# Patient Record
Sex: FEMALE | Race: WHITE | NOT HISPANIC OR LATINO | Employment: OTHER | URBAN - NONMETROPOLITAN AREA
[De-identification: names, ages, dates, MRNs, and addresses within clinical notes are randomized per-mention and may not be internally consistent; named-entity substitution may affect disease eponyms.]

---

## 2019-01-01 ENCOUNTER — APPOINTMENT (INPATIENT)
Dept: INTERVENTIONAL RADIOLOGY/VASCULAR | Facility: HOSPITAL | Age: 84
DRG: 597 | End: 2019-01-01
Attending: FAMILY MEDICINE
Payer: MEDICARE

## 2019-01-01 ENCOUNTER — TELEPHONE (OUTPATIENT)
Dept: MAMMOGRAPHY | Facility: CLINIC | Age: 84
End: 2019-01-01

## 2019-01-01 ENCOUNTER — HOSPITAL ENCOUNTER (OUTPATIENT)
Facility: HOSPITAL | Age: 84
End: 2019-03-03
Attending: FAMILY MEDICINE | Admitting: FAMILY MEDICINE

## 2019-01-01 ENCOUNTER — APPOINTMENT (EMERGENCY)
Dept: RADIOLOGY | Facility: HOSPITAL | Age: 84
DRG: 597 | End: 2019-01-01
Payer: MEDICARE

## 2019-01-01 ENCOUNTER — APPOINTMENT (INPATIENT)
Dept: NON INVASIVE DIAGNOSTICS | Facility: HOSPITAL | Age: 84
DRG: 597 | End: 2019-01-01
Payer: MEDICARE

## 2019-01-01 ENCOUNTER — HOSPITAL ENCOUNTER (INPATIENT)
Facility: HOSPITAL | Age: 84
LOS: 1 days | DRG: 597 | End: 2019-03-01
Attending: EMERGENCY MEDICINE | Admitting: FAMILY MEDICINE
Payer: MEDICARE

## 2019-01-01 ENCOUNTER — HOSPITAL ENCOUNTER (OUTPATIENT)
Dept: MAMMOGRAPHY | Facility: HOSPITAL | Age: 84
Discharge: HOME | End: 2019-01-31
Payer: MEDICARE

## 2019-01-01 ENCOUNTER — APPOINTMENT (EMERGENCY)
Dept: CT IMAGING | Facility: HOSPITAL | Age: 84
DRG: 597 | End: 2019-01-01
Payer: MEDICARE

## 2019-01-01 ENCOUNTER — TRANSCRIBE ORDERS (OUTPATIENT)
Dept: ADMINISTRATIVE | Facility: HOSPITAL | Age: 84
End: 2019-01-01

## 2019-01-01 ENCOUNTER — HOSPITAL ENCOUNTER (OUTPATIENT)
Dept: ULTRASOUND IMAGING | Facility: HOSPITAL | Age: 84
Discharge: HOME | End: 2019-01-31
Payer: MEDICARE

## 2019-01-01 VITALS
OXYGEN SATURATION: 77 % | TEMPERATURE: 98.7 F | BODY MASS INDEX: 21.38 KG/M2 | DIASTOLIC BLOOD PRESSURE: 46 MMHG | HEIGHT: 60 IN | SYSTOLIC BLOOD PRESSURE: 77 MMHG | WEIGHT: 108.91 LBS | HEART RATE: 118 BPM | RESPIRATION RATE: 16 BRPM

## 2019-01-01 VITALS
HEIGHT: 60 IN | TEMPERATURE: 98.5 F | SYSTOLIC BLOOD PRESSURE: 130 MMHG | BODY MASS INDEX: 21.4 KG/M2 | HEART RATE: 87 BPM | RESPIRATION RATE: 20 BRPM | DIASTOLIC BLOOD PRESSURE: 60 MMHG | OXYGEN SATURATION: 90 % | WEIGHT: 109 LBS

## 2019-01-01 VITALS — HEIGHT: 69 IN | BODY MASS INDEX: 26.66 KG/M2 | WEIGHT: 180 LBS

## 2019-01-01 DIAGNOSIS — R09.02 HYPOXIA: Primary | ICD-10-CM

## 2019-01-01 DIAGNOSIS — E87.0 HYPERNATREMIA: ICD-10-CM

## 2019-01-01 DIAGNOSIS — C79.9 METASTATIC DISEASE (HCC): ICD-10-CM

## 2019-01-01 DIAGNOSIS — J90 PLEURAL EFFUSION ON RIGHT: ICD-10-CM

## 2019-01-01 DIAGNOSIS — R92.8 ABNORMAL FINDINGS ON DIAGNOSTIC IMAGING OF BREAST: Primary | ICD-10-CM

## 2019-01-01 DIAGNOSIS — R41.82 ALTERED MENTAL STATUS: ICD-10-CM

## 2019-01-01 DIAGNOSIS — R93.89 ABNORMAL ULTRASOUND: Primary | ICD-10-CM

## 2019-01-01 DIAGNOSIS — N63.0 LUMP OR MASS IN BREAST: ICD-10-CM

## 2019-01-01 DIAGNOSIS — R06.00 DYSPNEA, UNSPECIFIED TYPE: ICD-10-CM

## 2019-01-01 DIAGNOSIS — N19 RENAL FAILURE: ICD-10-CM

## 2019-01-01 LAB
ALBUMIN SERPL BCP-MCNC: 1.9 G/DL (ref 3.5–5)
ALBUMIN SERPL BCP-MCNC: 2.3 G/DL (ref 3.5–5)
ALP SERPL-CCNC: 115 U/L (ref 46–116)
ALP SERPL-CCNC: 94 U/L (ref 46–116)
ALT SERPL W P-5'-P-CCNC: 19 U/L (ref 12–78)
ALT SERPL W P-5'-P-CCNC: 22 U/L (ref 12–78)
ANION GAP SERPL CALCULATED.3IONS-SCNC: 12 MMOL/L (ref 4–13)
ANION GAP SERPL CALCULATED.3IONS-SCNC: 14 MMOL/L (ref 4–13)
APTT PPP: 25 SECONDS (ref 26–38)
AST SERPL W P-5'-P-CCNC: 25 U/L (ref 5–45)
AST SERPL W P-5'-P-CCNC: 28 U/L (ref 5–45)
ATRIAL RATE: 123 BPM
BACTERIA BLD CULT: ABNORMAL
BASOPHILS # BLD AUTO: 0.01 THOUSANDS/ΜL (ref 0–0.1)
BASOPHILS # BLD AUTO: 0.01 THOUSANDS/ΜL (ref 0–0.1)
BASOPHILS NFR BLD AUTO: 0 % (ref 0–1)
BASOPHILS NFR BLD AUTO: 0 % (ref 0–1)
BILIRUB SERPL-MCNC: 0.3 MG/DL (ref 0.2–1)
BILIRUB SERPL-MCNC: 0.4 MG/DL (ref 0.2–1)
BILIRUB UR QL STRIP: ABNORMAL
BUN SERPL-MCNC: 76 MG/DL (ref 5–25)
BUN SERPL-MCNC: 78 MG/DL (ref 5–25)
CALCIUM SERPL-MCNC: 8.6 MG/DL (ref 8.3–10.1)
CALCIUM SERPL-MCNC: 9.5 MG/DL (ref 8.3–10.1)
CHLORIDE SERPL-SCNC: 112 MMOL/L (ref 100–108)
CHLORIDE SERPL-SCNC: 119 MMOL/L (ref 100–108)
CHOLEST SERPL-MCNC: 87 MG/DL (ref 50–200)
CLARITY UR: CLEAR
CO2 SERPL-SCNC: 24 MMOL/L (ref 21–32)
CO2 SERPL-SCNC: 26 MMOL/L (ref 21–32)
COLOR UR: YELLOW
CREAT SERPL-MCNC: 1.83 MG/DL (ref 0.6–1.3)
CREAT SERPL-MCNC: 2.22 MG/DL (ref 0.6–1.3)
CREAT UR-MCNC: 145 MG/DL
EOSINOPHIL # BLD AUTO: 0 THOUSAND/ΜL (ref 0–0.61)
EOSINOPHIL # BLD AUTO: 0 THOUSAND/ΜL (ref 0–0.61)
EOSINOPHIL NFR BLD AUTO: 0 % (ref 0–6)
EOSINOPHIL NFR BLD AUTO: 0 % (ref 0–6)
ERYTHROCYTE [DISTWIDTH] IN BLOOD BY AUTOMATED COUNT: 13.2 % (ref 11.6–15.1)
ERYTHROCYTE [DISTWIDTH] IN BLOOD BY AUTOMATED COUNT: 13.4 % (ref 11.6–15.1)
EST. AVERAGE GLUCOSE BLD GHB EST-MCNC: 166 MG/DL
FLUAV AG SPEC QL IA: NEGATIVE
FLUAV AG SPEC QL: NOT DETECTED
FLUBV AG SPEC QL IA: NEGATIVE
FLUBV AG SPEC QL: NOT DETECTED
GFR SERPL CREATININE-BSD FRML MDRD: 19 ML/MIN/1.73SQ M
GFR SERPL CREATININE-BSD FRML MDRD: 24 ML/MIN/1.73SQ M
GLUCOSE SERPL-MCNC: 127 MG/DL (ref 65–140)
GLUCOSE SERPL-MCNC: 134 MG/DL (ref 65–140)
GLUCOSE SERPL-MCNC: 150 MG/DL (ref 65–140)
GLUCOSE SERPL-MCNC: 209 MG/DL (ref 65–140)
GLUCOSE SERPL-MCNC: 212 MG/DL (ref 65–140)
GLUCOSE UR STRIP-MCNC: NEGATIVE MG/DL
GRAM STN SPEC: ABNORMAL
HBA1C MFR BLD: 7.4 % (ref 4.2–6.3)
HCT VFR BLD AUTO: 41.7 % (ref 34.8–46.1)
HCT VFR BLD AUTO: 48 % (ref 34.8–46.1)
HDLC SERPL-MCNC: 36 MG/DL (ref 40–60)
HGB BLD-MCNC: 12.8 G/DL (ref 11.5–15.4)
HGB BLD-MCNC: 15 G/DL (ref 11.5–15.4)
HGB UR QL STRIP.AUTO: NEGATIVE
IMM GRANULOCYTES # BLD AUTO: 0.04 THOUSAND/UL (ref 0–0.2)
IMM GRANULOCYTES # BLD AUTO: 0.08 THOUSAND/UL (ref 0–0.2)
IMM GRANULOCYTES NFR BLD AUTO: 0 % (ref 0–2)
IMM GRANULOCYTES NFR BLD AUTO: 1 % (ref 0–2)
INR PPP: 1.21 (ref 0.86–1.17)
KETONES UR STRIP-MCNC: NEGATIVE MG/DL
LACTATE SERPL-SCNC: 1.9 MMOL/L (ref 0.5–2)
LACTATE SERPL-SCNC: 2.7 MMOL/L (ref 0.5–2)
LDLC SERPL CALC-MCNC: 34 MG/DL (ref 0–100)
LEUKOCYTE ESTERASE UR QL STRIP: NEGATIVE
LYMPHOCYTES # BLD AUTO: 1.74 THOUSANDS/ΜL (ref 0.6–4.47)
LYMPHOCYTES # BLD AUTO: 3.03 THOUSANDS/ΜL (ref 0.6–4.47)
LYMPHOCYTES NFR BLD AUTO: 18 % (ref 14–44)
LYMPHOCYTES NFR BLD AUTO: 26 % (ref 14–44)
MAGNESIUM SERPL-MCNC: 2.4 MG/DL (ref 1.6–2.6)
MCH RBC QN AUTO: 28.8 PG (ref 26.8–34.3)
MCH RBC QN AUTO: 28.9 PG (ref 26.8–34.3)
MCHC RBC AUTO-ENTMCNC: 30.7 G/DL (ref 31.4–37.4)
MCHC RBC AUTO-ENTMCNC: 31.3 G/DL (ref 31.4–37.4)
MCV RBC AUTO: 92 FL (ref 82–98)
MCV RBC AUTO: 94 FL (ref 82–98)
MONOCYTES # BLD AUTO: 0.75 THOUSAND/ΜL (ref 0.17–1.22)
MONOCYTES # BLD AUTO: 0.89 THOUSAND/ΜL (ref 0.17–1.22)
MONOCYTES NFR BLD AUTO: 8 % (ref 4–12)
MONOCYTES NFR BLD AUTO: 8 % (ref 4–12)
MRSA NOSE QL CULT: NORMAL
NEUTROPHILS # BLD AUTO: 7 THOUSANDS/ΜL (ref 1.85–7.62)
NEUTROPHILS # BLD AUTO: 7.86 THOUSANDS/ΜL (ref 1.85–7.62)
NEUTS SEG NFR BLD AUTO: 65 % (ref 43–75)
NEUTS SEG NFR BLD AUTO: 74 % (ref 43–75)
NITRITE UR QL STRIP: NEGATIVE
NRBC BLD AUTO-RTO: 0 /100 WBCS
NRBC BLD AUTO-RTO: 0 /100 WBCS
NT-PROBNP SERPL-MCNC: 3224 PG/ML
P AXIS: 73 DEGREES
PH UR STRIP.AUTO: 5 [PH] (ref 4.5–8)
PHOSPHATE SERPL-MCNC: 3.6 MG/DL (ref 2.3–4.1)
PLATELET # BLD AUTO: 254 THOUSANDS/UL (ref 149–390)
PLATELET # BLD AUTO: 267 THOUSANDS/UL (ref 149–390)
PLATELET # BLD AUTO: 315 THOUSANDS/UL (ref 149–390)
PMV BLD AUTO: 9 FL (ref 8.9–12.7)
PMV BLD AUTO: 9.3 FL (ref 8.9–12.7)
PMV BLD AUTO: 9.4 FL (ref 8.9–12.7)
POTASSIUM SERPL-SCNC: 3.7 MMOL/L (ref 3.5–5.3)
POTASSIUM SERPL-SCNC: 4 MMOL/L (ref 3.5–5.3)
PR INTERVAL: 162 MS
PROCALCITONIN SERPL-MCNC: 0.18 NG/ML
PROCALCITONIN SERPL-MCNC: 0.2 NG/ML
PROT SERPL-MCNC: 6.4 G/DL (ref 6.4–8.2)
PROT SERPL-MCNC: 7.8 G/DL (ref 6.4–8.2)
PROT UR STRIP-MCNC: NEGATIVE MG/DL
PROTHROMBIN TIME: 14.7 SECONDS (ref 11.8–14.2)
QRS AXIS: -6 DEGREES
QRSD INTERVAL: 72 MS
QT INTERVAL: 308 MS
QTC INTERVAL: 440 MS
RBC # BLD AUTO: 4.43 MILLION/UL (ref 3.81–5.12)
RBC # BLD AUTO: 5.21 MILLION/UL (ref 3.81–5.12)
RSV B RNA SPEC QL NAA+PROBE: NOT DETECTED
SODIUM 24H UR-SCNC: 9 MOL/L
SODIUM SERPL-SCNC: 152 MMOL/L (ref 136–145)
SODIUM SERPL-SCNC: 155 MMOL/L (ref 136–145)
SP GR UR STRIP.AUTO: 1.02 (ref 1–1.03)
T WAVE AXIS: 116 DEGREES
TRIGL SERPL-MCNC: 85 MG/DL
TROPONIN I SERPL-MCNC: 0.11 NG/ML
TROPONIN I SERPL-MCNC: 0.14 NG/ML
UROBILINOGEN UR QL STRIP.AUTO: 0.2 E.U./DL
VENTRICULAR RATE: 123 BPM
WBC # BLD AUTO: 11.87 THOUSAND/UL (ref 4.31–10.16)
WBC # BLD AUTO: 9.54 THOUSAND/UL (ref 4.31–10.16)

## 2019-01-01 PROCEDURE — 74176 CT ABD & PELVIS W/O CONTRAST: CPT

## 2019-01-01 PROCEDURE — 83036 HEMOGLOBIN GLYCOSYLATED A1C: CPT | Performed by: FAMILY MEDICINE

## 2019-01-01 PROCEDURE — 80061 LIPID PANEL: CPT | Performed by: FAMILY MEDICINE

## 2019-01-01 PROCEDURE — 80053 COMPREHEN METABOLIC PANEL: CPT | Performed by: FAMILY MEDICINE

## 2019-01-01 PROCEDURE — 93005 ELECTROCARDIOGRAM TRACING: CPT

## 2019-01-01 PROCEDURE — 93010 ELECTROCARDIOGRAM REPORT: CPT | Performed by: INTERNAL MEDICINE

## 2019-01-01 PROCEDURE — 83605 ASSAY OF LACTIC ACID: CPT | Performed by: FAMILY MEDICINE

## 2019-01-01 PROCEDURE — 84145 PROCALCITONIN (PCT): CPT | Performed by: PHYSICIAN ASSISTANT

## 2019-01-01 PROCEDURE — 99285 EMERGENCY DEPT VISIT HI MDM: CPT

## 2019-01-01 PROCEDURE — 93306 TTE W/DOPPLER COMPLETE: CPT

## 2019-01-01 PROCEDURE — 71046 X-RAY EXAM CHEST 2 VIEWS: CPT

## 2019-01-01 PROCEDURE — 84300 ASSAY OF URINE SODIUM: CPT | Performed by: FAMILY MEDICINE

## 2019-01-01 PROCEDURE — 71250 CT THORAX DX C-: CPT

## 2019-01-01 PROCEDURE — 83605 ASSAY OF LACTIC ACID: CPT | Performed by: PHYSICIAN ASSISTANT

## 2019-01-01 PROCEDURE — 93306 TTE W/DOPPLER COMPLETE: CPT | Performed by: INTERNAL MEDICINE

## 2019-01-01 PROCEDURE — 99223 1ST HOSP IP/OBS HIGH 75: CPT | Performed by: FAMILY MEDICINE

## 2019-01-01 PROCEDURE — 87631 RESP VIRUS 3-5 TARGETS: CPT | Performed by: PHYSICIAN ASSISTANT

## 2019-01-01 PROCEDURE — 96360 HYDRATION IV INFUSION INIT: CPT

## 2019-01-01 PROCEDURE — 87040 BLOOD CULTURE FOR BACTERIA: CPT | Performed by: PHYSICIAN ASSISTANT

## 2019-01-01 PROCEDURE — 82948 REAGENT STRIP/BLOOD GLUCOSE: CPT

## 2019-01-01 PROCEDURE — 84484 ASSAY OF TROPONIN QUANT: CPT | Performed by: PHYSICIAN ASSISTANT

## 2019-01-01 PROCEDURE — 87147 CULTURE TYPE IMMUNOLOGIC: CPT | Performed by: PHYSICIAN ASSISTANT

## 2019-01-01 PROCEDURE — 83880 ASSAY OF NATRIURETIC PEPTIDE: CPT | Performed by: PHYSICIAN ASSISTANT

## 2019-01-01 PROCEDURE — 85025 COMPLETE CBC W/AUTO DIFF WBC: CPT | Performed by: FAMILY MEDICINE

## 2019-01-01 PROCEDURE — 80053 COMPREHEN METABOLIC PANEL: CPT | Performed by: PHYSICIAN ASSISTANT

## 2019-01-01 PROCEDURE — 84100 ASSAY OF PHOSPHORUS: CPT | Performed by: FAMILY MEDICINE

## 2019-01-01 PROCEDURE — 85049 AUTOMATED PLATELET COUNT: CPT | Performed by: FAMILY MEDICINE

## 2019-01-01 PROCEDURE — 81003 URINALYSIS AUTO W/O SCOPE: CPT | Performed by: PHYSICIAN ASSISTANT

## 2019-01-01 PROCEDURE — 83735 ASSAY OF MAGNESIUM: CPT | Performed by: FAMILY MEDICINE

## 2019-01-01 PROCEDURE — 85730 THROMBOPLASTIN TIME PARTIAL: CPT | Performed by: PHYSICIAN ASSISTANT

## 2019-01-01 PROCEDURE — 36415 COLL VENOUS BLD VENIPUNCTURE: CPT | Performed by: PHYSICIAN ASSISTANT

## 2019-01-01 PROCEDURE — 99239 HOSP IP/OBS DSCHRG MGMT >30: CPT | Performed by: FAMILY MEDICINE

## 2019-01-01 PROCEDURE — 85610 PROTHROMBIN TIME: CPT | Performed by: PHYSICIAN ASSISTANT

## 2019-01-01 PROCEDURE — 87081 CULTURE SCREEN ONLY: CPT | Performed by: PHYSICIAN ASSISTANT

## 2019-01-01 PROCEDURE — 99232 SBSQ HOSP IP/OBS MODERATE 35: CPT | Performed by: PHYSICIAN ASSISTANT

## 2019-01-01 PROCEDURE — 82570 ASSAY OF URINE CREATININE: CPT | Performed by: FAMILY MEDICINE

## 2019-01-01 PROCEDURE — 76642 ULTRASOUND BREAST LIMITED: CPT

## 2019-01-01 PROCEDURE — 70450 CT HEAD/BRAIN W/O DYE: CPT

## 2019-01-01 PROCEDURE — 84145 PROCALCITONIN (PCT): CPT | Performed by: FAMILY MEDICINE

## 2019-01-01 PROCEDURE — 85025 COMPLETE CBC W/AUTO DIFF WBC: CPT | Performed by: PHYSICIAN ASSISTANT

## 2019-01-01 PROCEDURE — 77066 DX MAMMO INCL CAD BI: CPT

## 2019-01-01 PROCEDURE — 84484 ASSAY OF TROPONIN QUANT: CPT | Performed by: FAMILY MEDICINE

## 2019-01-01 RX ORDER — SODIUM CHLORIDE 9 MG/ML
125 INJECTION, SOLUTION INTRAVENOUS CONTINUOUS
Status: DISCONTINUED | OUTPATIENT
Start: 2019-01-01 | End: 2019-01-01

## 2019-01-01 RX ORDER — SCOLOPAMINE TRANSDERMAL SYSTEM 1 MG/1
1 PATCH, EXTENDED RELEASE TRANSDERMAL
Status: DISCONTINUED | OUTPATIENT
Start: 2019-01-01 | End: 2019-01-01 | Stop reason: HOSPADM

## 2019-01-01 RX ORDER — LEVETIRACETAM 250 MG/1
250 TABLET ORAL 2 TIMES DAILY
Status: DISCONTINUED | OUTPATIENT
Start: 2019-01-01 | End: 2019-01-01 | Stop reason: HOSPADM

## 2019-01-01 RX ORDER — BISACODYL 10 MG
10 SUPPOSITORY, RECTAL RECTAL DAILY PRN
COMMUNITY

## 2019-01-01 RX ORDER — DEXTROSE AND SODIUM CHLORIDE 5; .33 G/100ML; G/100ML
125 INJECTION, SOLUTION INTRAVENOUS CONTINUOUS
Status: DISCONTINUED | OUTPATIENT
Start: 2019-01-01 | End: 2019-01-01

## 2019-01-01 RX ORDER — LORAZEPAM 2 MG/ML
0.5 INJECTION INTRAMUSCULAR
Status: DISCONTINUED | OUTPATIENT
Start: 2019-01-01 | End: 2019-01-01 | Stop reason: HOSPADM

## 2019-01-01 RX ORDER — GLIMEPIRIDE 2 MG/1
2 TABLET ORAL
COMMUNITY

## 2019-01-01 RX ORDER — ATORVASTATIN CALCIUM 40 MG/1
40 TABLET, FILM COATED ORAL EVERY EVENING
Status: DISCONTINUED | OUTPATIENT
Start: 2019-01-01 | End: 2019-01-01 | Stop reason: HOSPADM

## 2019-01-01 RX ORDER — HEPARIN SODIUM 5000 [USP'U]/ML
5000 INJECTION, SOLUTION INTRAVENOUS; SUBCUTANEOUS EVERY 8 HOURS SCHEDULED
Status: DISCONTINUED | OUTPATIENT
Start: 2019-01-01 | End: 2019-01-01 | Stop reason: HOSPADM

## 2019-01-01 RX ORDER — DEXTROSE AND SODIUM CHLORIDE 5; .2 G/100ML; G/100ML
150 INJECTION, SOLUTION INTRAVENOUS CONTINUOUS
Status: DISCONTINUED | OUTPATIENT
Start: 2019-01-01 | End: 2019-01-01 | Stop reason: HOSPADM

## 2019-01-01 RX ORDER — HALOPERIDOL 5 MG/ML
1 INJECTION INTRAMUSCULAR EVERY 6 HOURS PRN
Status: DISCONTINUED | OUTPATIENT
Start: 2019-01-01 | End: 2019-01-01 | Stop reason: HOSPADM

## 2019-01-01 RX ORDER — ASPIRIN AND DIPYRIDAMOLE 25; 200 MG/1; MG/1
1 CAPSULE, EXTENDED RELEASE ORAL 2 TIMES DAILY
Status: DISCONTINUED | OUTPATIENT
Start: 2019-01-01 | End: 2019-01-01 | Stop reason: HOSPADM

## 2019-01-01 RX ORDER — DEXTROSE AND SODIUM CHLORIDE 5; .33 G/100ML; G/100ML
150 INJECTION, SOLUTION INTRAVENOUS CONTINUOUS
Status: DISCONTINUED | OUTPATIENT
Start: 2019-01-01 | End: 2019-01-01

## 2019-01-01 RX ADMIN — SODIUM CHLORIDE 125 ML/HR: 0.9 INJECTION, SOLUTION INTRAVENOUS at 18:37

## 2019-01-01 RX ADMIN — SODIUM CHLORIDE 125 ML/HR: 0.9 INJECTION, SOLUTION INTRAVENOUS at 14:32

## 2019-01-01 RX ADMIN — SODIUM CHLORIDE 125 ML/HR: 0.9 INJECTION, SOLUTION INTRAVENOUS at 02:36

## 2019-01-01 RX ADMIN — MORPHINE SULFATE 2 MG: 2 INJECTION, SOLUTION INTRAMUSCULAR; INTRAVENOUS at 11:59

## 2019-01-01 RX ADMIN — MORPHINE SULFATE 2 MG: 2 INJECTION, SOLUTION INTRAMUSCULAR; INTRAVENOUS at 03:56

## 2019-01-01 RX ADMIN — HEPARIN SODIUM 5000 UNITS: 5000 INJECTION, SOLUTION INTRAVENOUS; SUBCUTANEOUS at 05:20

## 2019-01-01 RX ADMIN — LORAZEPAM 0.5 MG: 2 INJECTION, SOLUTION INTRAMUSCULAR; INTRAVENOUS at 23:11

## 2019-01-01 RX ADMIN — MORPHINE SULFATE 2 MG: 2 INJECTION, SOLUTION INTRAMUSCULAR; INTRAVENOUS at 20:04

## 2019-01-01 RX ADMIN — SCOPALAMINE 1 PATCH: 1 PATCH, EXTENDED RELEASE TRANSDERMAL at 15:59

## 2019-01-01 RX ADMIN — MORPHINE SULFATE 2 MG: 2 INJECTION, SOLUTION INTRAMUSCULAR; INTRAVENOUS at 20:07

## 2019-01-01 RX ADMIN — MORPHINE SULFATE 2 MG: 2 INJECTION, SOLUTION INTRAMUSCULAR; INTRAVENOUS at 15:56

## 2019-01-01 RX ADMIN — MORPHINE SULFATE 2 MG: 2 INJECTION, SOLUTION INTRAMUSCULAR; INTRAVENOUS at 08:28

## 2019-01-01 RX ADMIN — HEPARIN SODIUM 5000 UNITS: 5000 INJECTION, SOLUTION INTRAVENOUS; SUBCUTANEOUS at 18:38

## 2019-01-01 RX ADMIN — MORPHINE SULFATE 2 MG: 2 INJECTION, SOLUTION INTRAMUSCULAR; INTRAVENOUS at 23:48

## 2019-01-01 RX ADMIN — MORPHINE SULFATE 2 MG: 2 INJECTION, SOLUTION INTRAMUSCULAR; INTRAVENOUS at 04:17

## 2019-01-01 RX ADMIN — INSULIN LISPRO 1 UNITS: 100 INJECTION, SOLUTION INTRAVENOUS; SUBCUTANEOUS at 18:45

## 2019-01-01 RX ADMIN — DEXTROSE AND SODIUM CHLORIDE 150 ML/HR: 5; .2 INJECTION, SOLUTION INTRAVENOUS at 11:58

## 2019-01-01 RX ADMIN — MORPHINE SULFATE 2 MG: 2 INJECTION, SOLUTION INTRAMUSCULAR; INTRAVENOUS at 16:17

## 2019-01-01 RX ADMIN — MORPHINE SULFATE 2 MG: 2 INJECTION, SOLUTION INTRAMUSCULAR; INTRAVENOUS at 00:17

## 2019-02-28 PROBLEM — N17.9 ACUTE KIDNEY INJURY (HCC): Status: ACTIVE | Noted: 2019-01-01

## 2019-02-28 PROBLEM — E87.0 HYPERNATREMIA: Status: ACTIVE | Noted: 2019-01-01

## 2019-02-28 PROBLEM — N63.0 BREAST MASS: Status: ACTIVE | Noted: 2019-01-01

## 2019-02-28 PROBLEM — J90 PLEURAL EFFUSION: Status: ACTIVE | Noted: 2019-01-01

## 2019-02-28 PROBLEM — J96.01 ACUTE RESPIRATORY FAILURE WITH HYPOXIA (HCC): Status: ACTIVE | Noted: 2019-01-01

## 2019-02-28 PROBLEM — G93.41 METABOLIC ENCEPHALOPATHY: Status: ACTIVE | Noted: 2019-01-01

## 2019-02-28 NOTE — ED PROVIDER NOTES
History  Chief Complaint   Patient presents with    Shortness of Breath     Pt from [place] [address] home, was hypoxic and altered this morning      80 yr female BIBA from [facility] SNF with reports of not wanting to eat her breakfast this morning seemed short of breath and not herself this morning (altered) and also hypoxic 80% on room air, previously no o2 requirements  No history of fever cough vomiting diarrhea swelling rash or fall  Pt has been residing @ SNF for years  She is DNR  No further history available  History provided by:  EMS personnel and nursing home  Shortness of Breath       Prior to Admission Medications   Prescriptions Last Dose Informant Patient Reported? Taking? Insulin Glargine (TOUJEO MAX SOLOSTAR) 300 units/mL CONCETRATED U-300 injection pen   Yes Yes   Sig: Inject 300 Units under the skin daily   Multiple Vitamin (TAB-A-BESSY) TABS   Yes Yes   Sig: Take 1 tablet by mouth daily  acetaminophen (TYLENOL) 325 mg tablet   Yes Yes   Sig: Take 650 mg by mouth every 6 (six) hours as needed for mild pain or moderate pain  amLODIPine (NORVASC) 10 mg tablet   Yes Yes   Sig: Take 10 mg by mouth daily Indications: High Blood Pressure  aspirin-dipyridamole (AGGRENOX)  mg per 12 hr capsule   Yes Yes   Sig: Take 1 capsule by mouth 2 (two) times a day  atorvastatin (LIPITOR) 10 mg tablet   Yes Yes   Sig: Take 10 mg by mouth daily Indications: Increased Fats, Triglycerides & Cholesterol in the Blood  bisacodyl (BISAC-EVAC) 10 mg suppository   Yes Yes   Sig: Insert 10 mg into the rectum once Indications: if no bm on day 4    bisacodyl (DULCOLAX) 5 mg EC tablet   Yes Yes   Sig: Take 5 mg by mouth daily as needed for constipation     glimepiride (AMARYL) 2 mg tablet   Yes Yes   Sig: Take 2 mg by mouth every morning before breakfast   glucagon (GLUCAGEN DIAGNOSTIC) 1 mg injection Not Taking at Unknown time  Yes No   Si mg once Indications: if bs below 60 and cant take po    glucose 40 % Not Taking at Unknown time  Yes No   Sig: Take 1 g by mouth once Indications: 1 tube if bs below 60  insulin NPH (HumuLIN N,NovoLIN N) 100 Units/mL subcutaneous injection Not Taking at Unknown time  Yes No   Sig: Inject 28 Units under the skin daily Indications: Insulin-Dependent Diabetes  insulin NPH (HumuLIN N,NovoLIN N) 100 Units/mL subcutaneous injection Not Taking at Unknown time  Yes No   Sig: Inject 4 Units under the skin daily at bedtime  insulin aspart (NOVOLOG) 100 units/mL injection   Yes Yes   Sig: Inject under the skin 4 (four) times daily (after meals and at bedtime) Indications: sliding scale  levETIRAcetam (KEPPRA) 250 mg tablet   Yes Yes   Sig: Take 250 mg by mouth 2 (two) times a day Indications: Tonic-Clonic Seizures  magnesium hydroxide (MILK OF MAGNESIA) 400 mg/5 mL oral suspension   Yes Yes   Sig: Take 30 mL by mouth once Indications: if no bm on day 3    metolazone (ZAROXOLYN) 2 5 mg tablet Not Taking at Unknown time  Yes No   Sig: Take 2 5 mg by mouth daily Indications: on  and   mirtazapine (REMERON) 7 5 MG tablet Not Taking at Unknown time  Yes No   Sig: Take 7 5 mg by mouth daily at bedtime Indications: Major Depressive Disorder  omeprazole (PriLOSEC) 20 mg delayed release capsule   Yes Yes   Sig: Take 20 mg by mouth daily Indications: Gastroesophageal Reflux Disease  oxyCODONE (OXY-IR) 5 MG capsule Not Taking at Unknown time  No No   Si-2 tabs po q4-6hr prn pain   Patient not taking: Reported on 2019   senna (SENOKOT) 8 6 MG tablet Not Taking at Unknown time  Yes No   Sig: Take 1 tablet by mouth 2 (two) times a day Indications: Constipation  sertraline (ZOLOFT) 50 mg tablet Not Taking at Unknown time  Yes No   Sig: Take 75 mg by mouth daily     sodium phosphate (FLEET) enema   Yes Yes   Sig: Insert 1 enema into the rectum once Indications: if no bm on day 5  follow package directions   thiamine (VITAMIN B1) 100 mg tablet   Yes Yes   Sig: Take 100 mg by mouth daily  verapamil (VERELAN PM) 180 MG 24 hr capsule   Yes Yes   Sig: Take 180 mg by mouth daily Indications: High Blood Pressure of Unknown Cause  Facility-Administered Medications: None       Past Medical History:   Diagnosis Date    Depression     Diabetes mellitus ([place] [address] )     GERD (gastroesophageal reflux disease)     Hyperlipidemia     Hypertension     Psychiatric disorder     Seizures ([place] [address] )     Stroke ([place] [address] )     UTI (urinary tract infection)        Past Surgical History:   Procedure Laterality Date    APPENDECTOMY      CHOLECYSTECTOMY      HYSTERECTOMY      HI PARTIAL HIP REPLACEMENT Right 3/11/2016    Procedure: RIGHT HIP HEMIARTHROPLASTY (BIPOLAR); Surgeon: Kim Casper MD;  Location:  MAIN OR;  Service: Orthopedics    TONSILLECTOMY         History reviewed  No pertinent family history  I have reviewed and agree with the history as documented  Social History     Tobacco Use    Smoking status: Never Smoker    Smokeless tobacco: Never Used   Substance Use Topics    Alcohol use: No    Drug use: No        Review of Systems   Unable to perform ROS: Mental status change   Constitutional: Positive for activity change and appetite change  Respiratory: Positive for shortness of breath  Physical Exam  Physical Exam   Constitutional: She appears cachectic  She is sleeping  She is easily aroused  Non-toxic appearance  No distress  Elderly, frail   HENT:   Head: Normocephalic and atraumatic  Right Ear: External ear normal    Left Ear: External ear normal    Nose: Nose normal    Mouth/Throat: Mucous membranes are dry  She does not have dentures  No oral lesions  No posterior oropharyngeal erythema  Eyes: EOM are normal    Bilateral pupils oblong possibly sequelae of prior cataract surgery, unknown baseline  Makes eye contact and tracks provider around room   Cardiovascular: Regular rhythm, normal heart sounds, intact distal pulses and normal pulses  Tachycardia present  Pulses:       Radial pulses are 2+ on the right side, and 2+ on the left side  Pulmonary/Chest: Effort normal  No accessory muscle usage  No tachypnea  No respiratory distress  She has decreased breath sounds in the right upper field, the right middle field and the right lower field  She has no wheezes  She has no rhonchi  She has no rales  She exhibits no tenderness  Abdominal: Soft  Normal appearance  She exhibits no ascites  There is no tenderness  Neurological: She is easily aroused  She displays atrophy  She displays no tremor  She exhibits normal muscle tone  Awake or at least easily aroused, opens eyes to speech, nods yes/no to some questions, obeys commands and performs symmetrically such as take a deep breath, squeeze my hands, shrug my shoulders, push your toes down   Skin: Skin is warm and dry  Capillary refill takes less than 2 seconds  No rash noted  She is not diaphoretic  No erythema  No pallor  Psychiatric: She has a normal mood and affect  Nursing note and vitals reviewed        Vital Signs  ED Triage Vitals   Temperature Pulse Respirations Blood Pressure SpO2   02/28/19 1128 02/28/19 1128 02/28/19 1128 02/28/19 1128 02/28/19 1128   98 2 °F (36 8 °C) (!) 124 20 118/66 93 %      Temp Source Heart Rate Source Patient Position - Orthostatic VS BP Location FiO2 (%)   02/28/19 1128 02/28/19 1128 02/28/19 1128 02/28/19 1128 --   Temporal Monitor Lying Left arm       Pain Score       02/28/19 1400       No Pain           Vitals:    02/28/19 1400 02/28/19 1415 02/28/19 1511 02/28/19 1651   BP: 130/61 128/61 118/58 120/64   Pulse: 97 90 90 91   Patient Position - Orthostatic VS: Lying Sitting Lying Sitting       Visual Acuity  Visual Acuity      Most Recent Value   L Pupil Size (mm)  2   R Pupil Size (mm)  2 [Patient has an abnormally shaped pupil ]          ED Medications  Medications   sodium chloride 0 9 % infusion (125 mL/hr Intravenous New Bag 2/28/19 1432)    EMS REPLENISHMENT MED ( Does not apply Given to EMS 2/28/19 1136)       Diagnostic Studies  Results Reviewed     Procedure Component Value Units Date/Time    Procalcitonin [[id number]]  (Normal) Collected:  02/28/19 1137    Lab Status:  Final result Specimen:  Blood from Arm, Right Updated:  02/28/19 1657     Procalcitonin 0 20 ng/ml     Lactic acid, plasma [[id number]]     Lab Status:  No result Specimen:  Blood     Procalcitonin [[id number]]     Lab Status:  No result Specimen:  Blood     UA w Reflex to Microscopic w Reflex to Culture [[id number]]  (Abnormal) Collected:  02/28/19 1224    Lab Status:  Final result Specimen:  Urine, Straight Cath Updated:  02/28/19 1241     Color, UA Yellow     Clarity, UA Clear     Specific Gravity, UA 1 025     pH, UA 5 0     Leukocytes, UA Negative     Nitrite, UA Negative     Protein, UA Negative mg/dl      Glucose, UA Negative mg/dl      Ketones, UA Negative mg/dl      Urobilinogen, UA 0 2 E U /dl      Bilirubin, UA Interference- unable to analyze     Blood, UA Negative    Blood culture #1 [[id number]] Collected:  02/28/19 1235    Lab Status:   In process Specimen:  Blood from Arm, Left Updated:  02/28/19 1237    NT-BNP PRO [[id number]]  (Abnormal) Collected:  02/28/19 1137    Lab Status:  Final result Specimen:  Blood from Arm, Right Updated:  02/28/19 1219     NT-proBNP 3,224 pg/mL     Troponin I [[id number]]  (Abnormal) Collected:  02/28/19 1137    Lab Status:  Final result Specimen:  Blood from Arm, Right Updated:  02/28/19 1215     Troponin I 0 14 ng/mL     Comprehensive metabolic panel [[id number]]  (Abnormal) Collected:  02/28/19 1137    Lab Status:  Final result Specimen:  Blood from Arm, Right Updated:  02/28/19 1213     Sodium 152 mmol/L      Potassium 4 0 mmol/L      Chloride 112 mmol/L      CO2 26 mmol/L      ANION GAP 14 mmol/L      BUN 78 mg/dL      Creatinine 2 22 mg/dL      Glucose 209 mg/dL      Calcium 9 5 mg/dL      AST 25 U/L      ALT 22 U/L      Alkaline Phosphatase 115 U/L      Total Protein 7 8 g/dL      Albumin 2 3 g/dL      Total Bilirubin 0 40 mg/dL      eGFR 19 ml/min/1 73sq m     Narrative:       National Kidney Disease Education Program recommendations are as follows:  GFR calculation is accurate only with a steady state creatinine  Chronic Kidney disease less than 60 ml/min/1 73 sq  meters  Kidney failure less than 15 ml/min/1 73 sq  meters  Lactic acid x2 [[id number]]  (Abnormal) Collected:  02/28/19 1137    Lab Status:  Final result Specimen:  Blood from Arm, Right Updated:  02/28/19 1203     LACTIC ACID 2 7 mmol/L     Narrative:       Result may be elevated if tourniquet was used during collection  Rapid Influenza Screen with Reflex PCR [[id number]]  (Normal) Collected:  02/28/19 1137    Lab Status:  Final result Specimen:  Nasopharyngeal Swab Updated:  02/28/19 1202     Rapid Influenza A Ag Negative     Rapid Influenza B Ag Negative    INFLUENZA A/B AND RSV, PCR [[id number]] Collected:  02/28/19 1137    Lab Status: In process Specimen:  Nasopharyngeal Swab Updated:  02/28/19 1202    Protime-INR [[id number]]  (Abnormal) Collected:  02/28/19 1137    Lab Status:  Final result Specimen:  Blood from Arm, Right Updated:  02/28/19 1156     Protime 14 7 seconds      INR 1 21    APTT [[id number]]  (Abnormal) Collected:  02/28/19 1137    Lab Status:  Final result Specimen:  Blood from Arm, Right Updated:  02/28/19 1156     PTT 25 seconds     Blood culture #2 [[id number]] Collected:  02/28/19 1137    Lab Status:   In process Specimen:  Blood from Arm, Right Updated:  02/28/19 1154    CBC and differential [[id number]]  (Abnormal) Collected:  02/28/19 1137    Lab Status:  Final result Specimen:  Blood from Arm, Right Updated:  02/28/19 1144     WBC 11 87 Thousand/uL      RBC 5 21 Million/uL      Hemoglobin 15 0 g/dL      Hematocrit 48 0 %      MCV 92 fL      MCH 28 8 pg      MCHC 31 3 g/dL      RDW 13 2 %      MPV 9 0 fL      Platelets 224 Thousands/uL      nRBC 0 /100 WBCs Neutrophils Relative 65 %      Immat GRANS % 1 %      Lymphocytes Relative 26 %      Monocytes Relative 8 %      Eosinophils Relative 0 %      Basophils Relative 0 %      Neutrophils Absolute 7 86 Thousands/µL      Immature Grans Absolute 0 08 Thousand/uL      Lymphocytes Absolute 3 03 Thousands/µL      Monocytes Absolute 0 89 Thousand/µL      Eosinophils Absolute 0 00 Thousand/µL      Basophils Absolute 0 01 Thousands/µL                  CT chest abdomen pelvis wo contrast   Final Result by Renetta Mckeon MD (02/28 9109)   Limited exam without IV and oral contrast    1   Left breast mass/masses suggested suspicious for the primary malignancy  This is better seen on prior dedicated breast imaging of 1/31/2019    2   Innumerable bilateral pulmonary nodules compatible with metastasis  3   Large right pleural effusion, malignant pleural effusion not excluded  4  Mediastinal lymphadenopathy suspicious for metastasis  5  Nodularity of the left adrenal gland, metastasis is not excluded  6   Multiple hypodense lesions in the liver suspicious for metastasis  7   Sclerotic lesion at the L2 vertebral body suspicious for metastasis  The study was marked in EPIC for significant notification  Workstation performed: [id number]         CT head without contrast   Final Result by Renetta Mckeon MD (02/28 1205)      No acute intracranial abnormality  Chronic microangiopathic changes and stable areas of chronic infarction as noted above  Workstation performed: [id number]         XR chest pa & lateral   Final Result by Michael Matthews MD (02/28 2566)      1  Large right pleural effusion with associated high-grade multilobar/segmental collapse in the right lung  2   New nodular opacities scattered throughout the left lung fields  This could represent infectious process or metastatic disease  Recommend further evaluation with chest CT               I personally discussed this study with Kavonmichael Kwok on 2/28/2019 at 12:58 PM             Workstation performed: [id number]                    Procedures  ECG 12 Lead Documentation  Date/Time: 2/28/2019 5:04 PM  Performed by: Alec Chong PA-C  Authorized by: Alec Chong PA-C              Phone Contacts  ED Phone Contact    ED Course  ED Course as of Feb 28 1708   Thu Feb 28, 2019   1158 WBC(!): 11 87   1158 Hemoglobin: 15 0   1158 HCT(!): 48 0   1158 Platelet Count: 120   1158 PTT(!): 25   1158 Protime(!): 14 7   1158 INR(!): 1 21   1204 LACTIC ACID(!!): 2 7   1206 Rapid Influenza A Ag: Negative   1206 Rapid Influenza B Ag: Negative   1221 Troponin I(!!): 0 14   1221 NT-proBNP(!): 3,224   1221 Sodium(!): 152   1221 Potassium: 4 0   1221 Chloride(!): 112   1221 CO2: 26   1221 Anion Gap(!): 14   1221 BUN(!): 78   1221 Creatinine(!): 2 22   1221 Glucose, Random(!): 209   1221 eGFR: 19   1325 No family called or arrived for patient  SNF papers states DNR but wants hospitalization  Unclear if known metastatic disease or not    I see old orders for breast mass imaging that were cancelled 2 yrs ago  Will pursue imaging for likely underlying primary malignancy in case pt/family would pursue further workup  In meantime pt is stable on 3L nasal canula o2 with sats 94% HR 92  She is sleeping but opens her eyes to speech and makes eye contact  She moans and nods her head yes/no at times  Otherwise no verbal history or complaints  1418 Color, UA: Yellow   1418 Leukocytes, UA: Negative   1418 Nitrite, UA: Negative   1418 Blood, UA: Negative   1424 I spoke with daughter in law Steve benitez on the phone  [place] [facility] and would prefer update via phone and are not able to drive over right now  I updated her on patient status that she is not awake or alert which she confirms is a definite change from baseline states patient normally able to talk   I updated her on that pt has some additional locations of metastases she is aware of the likely breast cancer and had been part of family declining further workup with bx or tx  The phone call then dropped and was not able to ask further history or directives  Will try calling back in a few minutes  [phone]- I spoke again with daughter in law Carmen Petty and son Humphrey Chanel again discussed findigns today suggest widespread metastatic disease, plan to admit to medical service  Confirmed dnr/dni and no invasive procedures  Not yet interested in hospice/comfort care  Will be arriving around 6pm to visit  OK if slim doctor calls family also    1500- dr Jaiden bazan at bedside to examine patient, accepts to full medical admit    MDM  Number of Diagnoses or Management Options  Altered mental status: new and requires workup  Dyspnea, unspecified type: new and requires workup  Hypernatremia: new and requires workup  Hypoxia: new and requires workup  Metastatic disease ([facility] [address] ): new and requires workup  Pleural effusion on right: new and requires workup  Renal failure: new and requires workup     Amount and/or Complexity of Data Reviewed  Clinical lab tests: ordered and reviewed  Tests in the radiology section of CPT®: ordered and reviewed  Review and summarize past medical records: yes  Discuss the patient with other providers: yes  Independent visualization of images, tracings, or specimens: yes    Risk of Complications, Morbidity, and/or Mortality  Presenting problems: high  Diagnostic procedures: high  Management options: high    Patient Progress  Patient progress: improved      Disposition  Final diagnoses:   Dyspnea, unspecified type   Hypoxia   Pleural effusion on right   Metastatic disease ([facility] [address] )   Altered mental status   Hypernatremia   Renal failure     Time reflects when diagnosis was documented in both MDM as applicable and the Disposition within this note     Time User Action Codes Description Comment    2/28/2019  3:18 PM Kristie Medley Add [R06 00] Dyspnea, unspecified type     2/28/2019  3:18 PM Kristie Medley Add [R09 02] Hypoxia     2/28/2019  3:18 PM Macel Yohan Add [J90] Pleural effusion on right     2/28/2019  3:21 PM Macel Yohan Add [C79 9] Metastatic disease ([address] )     2/28/2019  3:25 PM Macel Yohan Add [R41 82] Altered mental status     2/28/2019  3:25 PM Macel Yohan Add [E87 0] Hypernatremia     2/28/2019  3:25 PM Macel Yohan Add [N19] Renal failure     2/28/2019  3:25 PM Macel Yohan Modify [R06 00] Dyspnea, unspecified type     2/28/2019  3:25 PM Macel Yohan Modify [R09 02] Hypoxia       ED Disposition     ED Disposition Condition Date/Time Comment    Admit Fair Thu Feb 28, 2019  3:18 PM Case was discussed with LEE ANN and the patient's admission status was agreed to be Admission Status: inpatient status to the service of Dr Dedrick Monet   Follow-up Information    None         Current Discharge Medication List      CONTINUE these medications which have NOT CHANGED    Details   acetaminophen (TYLENOL) 325 mg tablet Take 650 mg by mouth every 6 (six) hours as needed for mild pain or moderate pain  amLODIPine (NORVASC) 10 mg tablet Take 10 mg by mouth daily Indications: High Blood Pressure  aspirin-dipyridamole (AGGRENOX)  mg per 12 hr capsule Take 1 capsule by mouth 2 (two) times a day  atorvastatin (LIPITOR) 10 mg tablet Take 10 mg by mouth daily Indications: Increased Fats, Triglycerides & Cholesterol in the Blood  bisacodyl (BISAC-EVAC) 10 mg suppository Insert 10 mg into the rectum once Indications: if no bm on day 4       bisacodyl (DULCOLAX) 5 mg EC tablet Take 5 mg by mouth daily as needed for constipation  glimepiride (AMARYL) 2 mg tablet Take 2 mg by mouth every morning before breakfast      insulin aspart (NOVOLOG) 100 units/mL injection Inject under the skin 4 (four) times daily (after meals and at bedtime) Indications: sliding scale        Insulin Glargine (TOUJEO MAX SOLOSTAR) 300 units/mL CONCETRATED U-300 injection pen Inject 300 Units under the skin daily levETIRAcetam (KEPPRA) 250 mg tablet Take 250 mg by mouth 2 (two) times a day Indications: Tonic-Clonic Seizures  magnesium hydroxide (MILK OF MAGNESIA) 400 mg/5 mL oral suspension Take 30 mL by mouth once Indications: if no bm on day 3  Multiple Vitamin (TAB-A-BESSY) TABS Take 1 tablet by mouth daily  omeprazole (PriLOSEC) 20 mg delayed release capsule Take 20 mg by mouth daily Indications: Gastroesophageal Reflux Disease  sodium phosphate (FLEET) enema Insert 1 enema into the rectum once Indications: if no bm on day 5  follow package directions      thiamine (VITAMIN B1) 100 mg tablet Take 100 mg by mouth daily  verapamil (VERELAN PM) 180 MG 24 hr capsule Take 180 mg by mouth daily Indications: High Blood Pressure of Unknown Cause  glucagon (GLUCAGEN DIAGNOSTIC) 1 mg injection 1 mg once Indications: if bs below 60 and cant take po       glucose 40 % Take 1 g by mouth once Indications: 1 tube if bs below 60       !! insulin NPH (HumuLIN N,NovoLIN N) 100 Units/mL subcutaneous injection Inject 28 Units under the skin daily Indications: Insulin-Dependent Diabetes  !! insulin NPH (HumuLIN N,NovoLIN N) 100 Units/mL subcutaneous injection Inject 4 Units under the skin daily at bedtime  metolazone (ZAROXOLYN) 2 5 mg tablet Take 2 5 mg by mouth daily Indications: on mondays and thursdays  mirtazapine (REMERON) 7 5 MG tablet Take 7 5 mg by mouth daily at bedtime Indications: Major Depressive Disorder  oxyCODONE (OXY-IR) 5 MG capsule 1-2 tabs po q4-6hr prn pain  Qty: 30 capsule, Refills: 0      senna (SENOKOT) 8 6 MG tablet Take 1 tablet by mouth 2 (two) times a day Indications: Constipation  sertraline (ZOLOFT) 50 mg tablet Take 75 mg by mouth daily  !! - Potential duplicate medications found  Please discuss with provider  No discharge procedures on file      ED Provider  Electronically Signed by           Brian Dodge PA-C  02/28/19 9762

## 2019-02-28 NOTE — ASSESSMENT & PLAN NOTE
Likely hypovolemic hypernatremia  Patient looked very dehydrated on admission, appears to be improving, but sodium still elevated and urine very concentrated  Will continue 1/4 NS with D5 for now at 150mL/hr    Monitor I&Os

## 2019-02-28 NOTE — ASSESSMENT & PLAN NOTE
Likely stage IV breast cancer with mets noted diffusely on CT scan  Patient's son states they were informed of breast lumps over the past 2 weeks  Patient's son also states the patient declined any intervention, seeking hospice care at this time

## 2019-02-28 NOTE — ASSESSMENT & PLAN NOTE
Patient is requiring 3 L nasal cannula to maintain oxygen saturation in the low 90s  She does not wear any oxygen at home  This is likely secondary to large right pleural effusion

## 2019-02-28 NOTE — ASSESSMENT & PLAN NOTE
Unclear if this is due to CVA vs   Hypernatremia vs  Underlying malignancy   She has no focal neurologic symptoms however Will obtain neuro checks q 4 hours  Her son also stated that she sometimes does not talk, stares  She is likely close to her baseline at this time  See Treatment Plan for Hypernatremia   Overall, patient's family is agreeable to hospice care, will consult hospice but in the meantime continue supportive care and treatment efforts, just nothing invasive

## 2019-02-28 NOTE — ASSESSMENT & PLAN NOTE
Lab Results   Component Value Date    HGBA1C 6 6 (H) 03/11/2016       No results for input(s): POCGLU in the last 72 hours      Blood Sugar Average: Last 72 hrs:   hold oral antihyperglycemics  Accu-Cheks AC and HS with algorithm 1 sliding scale coverage

## 2019-02-28 NOTE — ASSESSMENT & PLAN NOTE
Likely malignant effusion  Plan is for hospice care, will confirm and would not pursue IR consultation for thoracocentesis at this time

## 2019-02-28 NOTE — SOCIAL WORK
Pt was admitted from [facility]  CM spoke with Rachael in admissions dept  at SNF who stated pt is a long term resident and is a 15 day MA bedhold

## 2019-02-28 NOTE — ASSESSMENT & PLAN NOTE
Not clear what patient's renal baseline is  Creatinine is improving today at 1 83    Check urine sodium and urine creatinine  Volume expansion from IVF  No sign of obstructive uropathy on CT scan

## 2019-02-28 NOTE — ED NOTES
[id number] [place] called for patient status  Informed we are awaiting CT scan results        Jeniffer Jovel RN  02/28/19 3829

## 2019-02-28 NOTE — H&P
H&P Exam - Gearline Going 80 y o  female MRN: [id number]    Unit/Bed#: RM03 Encounter: [id number]      Metabolic encephalopathy  Assessment & Plan  Unclear if this is due to CVA vs   Hypernatremia vs  Underlying malignancy   She has no focal neurologic symptoms however Will obtain neuro checks q 4 hours  Her son also stated that she sometimes does not talk   See Treatment Plan for Hypernatremia     Pleural effusion  Assessment & Plan  Likely malignant effusion  Will consider IR thoracentesis if family is agreeable    Acute respiratory failure with hypoxia ([address] )  Assessment & Plan  Patient is requiring 3 L nasal cannula to maintain oxygen saturation in the low 90s  She does not wear any oxygen at home  This is likely secondary to large right pleural effusion    Breast mass  Assessment & Plan  Likely stage IV breast cancer with mets noted diffusely on CT scan  Patient's son states there were informed of breast lumps over the past 2 weeks  Patient's son also states the patient declined any intervention    Acute kidney injury [facility])  Assessment & Plan  Not clear what patient's renal baseline is  Check urine sodium and urine creatinine  Volume expansion in the form of normal saline at 125cc/hr  No sign of obstructive uropathy on CT scan    Hypernatremia  Assessment & Plan  Likely hypovolemic hypernatremia  Patient looks very dehydrated  Normal saline at 125cc / hour  Monitor I&Os      Seizure disorder ([facility] )  Assessment & Plan  Continue Keppra  P r n  Ativan for seizures    History of stroke  Assessment & Plan  Continue Aggrenox  Neurochecks Q 4  Fasting lipid panel  A1c      Diabetes mellitus (HCC)  Assessment & Plan  Lab Results   Component Value Date    HGBA1C 6 6 (H) 03/11/2016       No results for input(s): POCGLU in the last 72 hours      Blood Sugar Average: Last 72 hrs:   hold oral antihyperglycemics  Accu-Cheks AC and HS with algorithm 1 sliding scale coverage        History of Present Illness      Patient is a 77-year-old female from [facility] who was brought to the emergency room for evaluation after noted to be hypoxic  Patient is currently nonverbal and history is obtained via a review of medical records and speaking with the patient's son  The patient was noted to be hypoxic and short of breath earlier today at [facility]  Supplemental oxygen was placed on the patient with improvement of her saturations to the low 90s  She was then transported to [facility] were imaging studies are discussed below per    I spoke with the patient's son Daron Leavitt at approximately [phone] today and reviewed the patient's findings on CT scan along with blood work  The patient son stated that they were made aware of breast lumps 2-3 weeks ago  At which time the patient did not wish to pursue these  Given what appears to be metastatic disease on CT scan I discussed the possibility of hospice/comfort care measures with the patient's son  He will discuss with his brother and believes they will pursue comfort care measures  I reviewed the patient's advance directive and she specifically does not want resuscitation or antibiotics  I also discussed with the son if she expressed any wishes regarding her goals of care, he said she did not  I asked him if it would be okay to administer intravenous fluids and he was agreeable to that       Review of Systems   Unable to perform ROS: Patient nonverbal       Historical Information   Past Medical History:   Diagnosis Date    Depression     Diabetes mellitus ([facility] )     GERD (gastroesophageal reflux disease)     Hyperlipidemia     Hypertension     Psychiatric disorder     Seizures ([facility] )     Stroke ([facility] )     UTI (urinary tract infection)      Past Surgical History:   Procedure Laterality Date    APPENDECTOMY      CHOLECYSTECTOMY      HYSTERECTOMY      HI PARTIAL HIP REPLACEMENT Right 3/11/2016    Procedure: RIGHT HIP HEMIARTHROPLASTY (BIPOLAR); Surgeon: Sumi Saunders MD;  Location: [facility];  Service: Orthopedics    TONSILLECTOMY       Social History   Social History     Substance and Sexual Activity   Alcohol Use No     Social History     Substance and Sexual Activity   Drug Use No     Social History     Tobacco Use   Smoking Status Never Smoker   Smokeless Tobacco Never Used     Family History: non-contributory    Meds/Allergies   all medications and allergies reviewed  No Known Allergies    Objective   First Vitals:   Blood Pressure: 118/66 (02/28/19 1128)  Pulse: (!) 124 (02/28/19 1128)  Temperature: 98 2 °F (36 8 °C) (02/28/19 1128)  Temp Source: Temporal (02/28/19 1128)  Respirations: 20 (02/28/19 1128)  Height: 5' 9" (175 3 cm) (02/28/19 1128)  Weight - Scale: 51 5 kg (113 lb 8 6 oz) (02/28/19 1128)  SpO2: 93 % (02/28/19 1128)    Current Vitals:   Blood Pressure: 118/58 (02/28/19 1511)  Pulse: 90 (02/28/19 1511)  Temperature: 98 2 °F (36 8 °C) (02/28/19 1128)  Temp Source: Temporal (02/28/19 1128)  Respirations: 16 (02/28/19 1511)  Height: 5' 9" (175 3 cm) (02/28/19 1128)  Weight - Scale: 51 5 kg (113 lb 8 6 oz) (02/28/19 1128)  SpO2: 91 % (02/28/19 1511)    No intake or output data in the 24 hours ending 02/28/19 1554    Invasive Devices     Peripheral Intravenous Line            Peripheral IV 02/28/19 Right Antecubital less than 1 day                Physical Exam   Constitutional: No distress  HENT:   Head: Normocephalic and atraumatic  Dry oral mucosa   Eyes: EOM are normal  No scleral icterus  Neck: Neck supple  No JVD present  Cardiovascular: Normal rate and regular rhythm  Exam reveals no friction rub  No murmur heard  Pulmonary/Chest: Effort normal  No stridor  No respiratory distress  Absent breath sounds on the right  Scattered rhonchi   Abdominal: Soft  She exhibits no distension  There is tenderness  There is no guarding     Mild tenderness noted in the hypogastrium   Musculoskeletal: Normal range of motion  She exhibits no edema or deformity  Neurological: She is alert  Patient is able to follow commands  She is nonverbal  Strength is equal and symmetric in bilateral upper and lower extremities     Skin: Skin is dry  Capillary refill takes more than 3 seconds  She is not diaphoretic  Lab Results:   Lab Results   Component Value Date    CALCIUM 9 5 02/28/2019    K 4 0 02/28/2019    CO2 26 02/28/2019     (H) 02/28/2019    BUN 78 (H) 02/28/2019    CREATININE 2 22 (H) 02/28/2019       Imaging:   CT chest abdomen pelvis wo contrast   Final Result   Limited exam without IV and oral contrast    1   Left breast mass/masses suggested suspicious for the primary malignancy  This is better seen on prior dedicated breast imaging of 1/31/2019    2   Innumerable bilateral pulmonary nodules compatible with metastasis  3   Large right pleural effusion, malignant pleural effusion not excluded  4  Mediastinal lymphadenopathy suspicious for metastasis  5  Nodularity of the left adrenal gland, metastasis is not excluded  6   Multiple hypodense lesions in the liver suspicious for metastasis  7   Sclerotic lesion at the L2 vertebral body suspicious for metastasis  The study was marked in EPIC for significant notification  Workstation performed: [id number]         CT head without contrast   Final Result      No acute intracranial abnormality  Chronic microangiopathic changes and stable areas of chronic infarction as noted above  Workstation performed: [id number]         XR chest pa & lateral   Final Result      1  Large right pleural effusion with associated high-grade multilobar/segmental collapse in the right lung  2   New nodular opacities scattered throughout the left lung fields  This could represent infectious process or metastatic disease  Recommend further evaluation with chest CT               I personally discussed this study with JANIA SALVADOR [place] on 2/28/2019 at 12:58 PM             Workstation performed: [id number]           EKG, Pathology, and Other Studies:   NSR    Code Status: Prior  Advance Directive and Living Will:      Power of :    POLST:      Counseling / Coordination of Care:

## 2019-02-28 NOTE — PLAN OF CARE
Problem: Potential for Falls  Goal: Patient will remain free of falls  Description  INTERVENTIONS:  - Assess patient frequently for physical needs  -  Identify cognitive and physical deficits and behaviors that affect risk of falls  -  [place] fall precautions as indicated by assessment  High fall risk    - Educate patient/family on patient safety including physical limitations  - Instruct patient to call for assistance with activity based on assessment  - Modify environment to reduce risk of injury  - Consider OT/PT consult to assist with strengthening/mobility   Outcome: Progressing     Problem: Prexisting or High Potential for Compromised Skin Integrity  Goal: Skin integrity is maintained or improved  Description  INTERVENTIONS:  - Identify patients at risk for skin breakdown  - Assess and monitor skin integrity  - Assess and monitor nutrition and hydration status  - Monitor labs (i e  albumin)  - Assess for incontinence   - Turn and reposition patient  - Assist with mobility/ambulation  - Relieve pressure over bony prominences  - Avoid friction and shearing  - Provide appropriate hygiene as needed including keeping skin clean and dry  - Evaluate need for skin moisturizer/barrier cream  - Collaborate with interdisciplinary team (i e  Nutrition, Rehabilitation, etc )   - Patient/family teaching  Outcome: Progressing     Problem: Nutrition/Hydration-ADULT  Goal: Nutrient/Hydration intake appropriate for improving, restoring or maintaining nutritional needs  Description  Monitor and assess patient's nutrition/hydration status for malnutrition (ex- brittle hair, bruises, dry skin, pale skin and conjunctiva, muscle wasting, smooth red tongue, and disorientation)  Collaborate with interdisciplinary team and initiate plan and interventions as ordered  Monitor patient's weight and dietary intake as ordered or per policy  Utilize nutrition screening tool and intervene per policy   Determine patient's food preferences and provide high-protein, high-caloric foods as appropriate       INTERVENTIONS:  - Monitor oral intake, urinary output, labs, and treatment plans  - Assess nutrition and hydration status and recommend course of action  - Evaluate amount of meals eaten  - Assist patient with eating if necessary   - Allow adequate time for meals  - Recommend/ encourage appropriate diets, oral nutritional supplements, and vitamin/mineral supplements  - Order, calculate, and assess calorie counts as needed  - Recommend, monitor, and adjust tube feedings and TPN/PPN based on assessed needs  - Assess need for intravenous fluids  - Provide specific nutrition/hydration education as appropriate  - Include patient/family/caregiver in decisions related to nutrition  Outcome: Progressing     Problem: PAIN - ADULT  Goal: Verbalizes/displays adequate comfort level or baseline comfort level  Description  Interventions:  - Encourage patient to monitor pain and request assistance  - Assess pain using appropriate pain scale  - Administer analgesics based on type and severity of pain and evaluate response  - Implement non-pharmacological measures as appropriate and evaluate response  - Consider cultural and social influences on pain and pain management  - Notify physician/advanced practitioner if interventions unsuccessful or patient reports new pain  Outcome: Progressing     Problem: INFECTION - ADULT  Goal: Absence or prevention of progression during hospitalization  Description  INTERVENTIONS:  - Assess and monitor for signs and symptoms of infection  - Monitor lab/diagnostic results  - Monitor all insertion sites, i e  indwelling lines, tubes, and drains  - Administer medications as ordered  - Instruct and encourage patient and family to use good hand hygiene technique  - Identify and instruct in appropriate isolation precautions for identified infection/condition   Outcome: Progressing     Problem: SAFETY ADULT  Goal: Patient will remain free of falls  Description  INTERVENTIONS:  - Assess patient frequently for physical needs  -  Identify cognitive and physical deficits and behaviors that affect risk of falls  -  [place] fall precautions as indicated by assessment   High fall risk    - Educate patient/family on patient safety including physical limitations  - Instruct patient to call for assistance with activity based on assessment  - Modify environment to reduce risk of injury  - Consider OT/PT consult to assist with strengthening/mobility   Outcome: Progressing  Goal: Maintain or return to baseline ADL function  Description  INTERVENTIONS:  -  Assess patient's ability to carry out ADLs; assess patient's baseline for ADL function and identify physical deficits which impact ability to perform ADLs (bathing, care of mouth/teeth, toileting, grooming, dressing, etc )  - Assess/evaluate cause of self-care deficits   - Assess range of motion  - Assess patient's mobility; develop plan if impaired  - Assess patient's need for assistive devices and provide as appropriate  - Encourage maximum independence but intervene and supervise when necessary  ¯ Involve family in performance of ADLs  ¯ Assess for home care needs following discharge   ¯ Request OT consult to assist with ADL evaluation and planning for discharge  ¯ Provide patient education as appropriate  Outcome: Progressing  Goal: Maintain or return mobility status to optimal level  Description  INTERVENTIONS:  - Assess patient's baseline mobility status (ambulation, transfers, stairs, etc )    - Identify cognitive and physical deficits and behaviors that affect mobility  - Identify mobility aids required to assist with transfers and/or ambulation (gait belt, sit-to-stand, lift, walker, cane, etc )  - [place] fall precautions as indicated by assessment  - Record patient progress and toleration of activity level on Mobility SBAR; progress patient to next Phase/Stage  - Instruct patient to call for assistance with activity based on assessment  - Request Rehabilitation consult to assist with strengthening/weightbearing, etc   Outcome: Progressing     Problem: DISCHARGE PLANNING  Goal: Discharge to home or other facility with appropriate resources  Description  INTERVENTIONS:  - Identify barriers to discharge w/patient and caregiver  - Arrange for needed discharge resources and transportation as appropriate  - Identify discharge learning needs (meds, wound care, etc )  - Arrange for interpretive services to assist at discharge as needed  - Refer to Case Management Department for coordinating discharge planning if the patient needs post-hospital services based on physician/advanced practitioner order or complex needs related to functional status, cognitive ability, or social support system  Outcome: Progressing     Problem: Knowledge Deficit  Goal: Patient/family/caregiver demonstrates understanding of disease process, treatment plan, medications, and discharge instructions  Description  Complete learning assessment and assess knowledge base    Interventions:  - Provide teaching at level of understanding  - Provide teaching via preferred learning methods  Outcome: Progressing     Problem: METABOLIC, FLUID AND ELECTROLYTES - ADULT  Goal: Electrolytes maintained within normal limits  Description  INTERVENTIONS:  - Monitor labs and assess patient for signs and symptoms of electrolyte imbalances  - Administer electrolyte replacement as ordered  - Monitor response to electrolyte replacements, including repeat lab results as appropriate  - Instruct patient on fluid and nutrition as appropriate  Outcome: Progressing  Goal: Glucose maintained within target range  Description  INTERVENTIONS:  - Monitor Blood Glucose as ordered  - Assess for signs and symptoms of hyperglycemia and hypoglycemia  - Administer ordered medications to maintain glucose within target range  - Assess nutritional intake and initiate nutrition service referral as needed  Outcome: Progressing     Problem: SKIN/TISSUE INTEGRITY - ADULT  Goal: Skin integrity remains intact  Description  INTERVENTIONS  - Identify patients at risk for skin breakdown  - Assess and monitor skin integrity  - Assess and monitor nutrition and hydration status  - Monitor labs (i e  albumin)  - Assess for incontinence   - Turn and reposition patient  - Assist with mobility/ambulation  - Relieve pressure over bony prominences  - Avoid friction and shearing  - Provide appropriate hygiene as needed including keeping skin clean and dry  - Evaluate need for skin moisturizer/barrier cream  - Collaborate with interdisciplinary team (i e  Nutrition, Rehabilitation, etc )   - Patient/family teaching  Outcome: Progressing     Problem: MUSCULOSKELETAL - ADULT  Goal: Maintain or return mobility to safest level of function  Description  INTERVENTIONS:  - Assess patient's ability to carry out ADLs; assess patient's baseline for ADL function and identify physical deficits which impact ability to perform ADLs (bathing, care of mouth/teeth, toileting, grooming, dressing, etc )  - Assess/evaluate cause of self-care deficits   - Assess range of motion  - Assess patient's mobility; develop plan if impaired  - Assess patient's need for assistive devices and provide as appropriate  - Encourage maximum independence but intervene and supervise when necessary  - Involve family in performance of ADLs  - Assess for home care needs following discharge   - Request OT consult to assist with ADL evaluation and planning for discharge  - Provide patient education as appropriate  Outcome: Progressing

## 2019-02-28 NOTE — ASSESSMENT & PLAN NOTE
Continue Aggrenox  Neurochecks Q 4  Fasting lipid panel is unremarkable with an LDL of 34  A1c - 7 4 which for her age is fairly well controlled  Patient appears to be close to her baseline from a mental status standpoint

## 2019-03-01 NOTE — PLAN OF CARE
Current diet appropriate  Will refrain from aggressive nutrition interventions at this time  Problem: Nutrition/Hydration-ADULT  Goal: Nutrient/Hydration intake appropriate for improving, restoring or maintaining nutritional needs  Description  Monitor and assess patient's nutrition/hydration status for malnutrition (ex- brittle hair, bruises, dry skin, pale skin and conjunctiva, muscle wasting, smooth red tongue, and disorientation)  Collaborate with interdisciplinary team and initiate plan and interventions as ordered  Monitor patient's weight and dietary intake as ordered or per policy  Utilize nutrition screening tool and intervene per policy  Determine patient's food preferences and provide high-protein, high-caloric foods as appropriate       INTERVENTIONS:  - Monitor oral intake, urinary output, labs, and treatment plans  - Assess nutrition and hydration status and recommend course of action  - Evaluate amount of meals eaten  - Assist patient with eating if necessary   - Allow adequate time for meals  - Recommend/ encourage appropriate diets, oral nutritional supplements, and vitamin/mineral supplements  - Order, calculate, and assess calorie counts as needed  - Recommend, monitor, and adjust tube feedings and TPN/PPN based on assessed needs  - Assess need for intravenous fluids  - Provide specific nutrition/hydration education as appropriate  - Include patient/family/caregiver in decisions related to nutrition  Outcome: Progressing

## 2019-03-01 NOTE — UTILIZATION REVIEW
Initial Clinical Review    Admission:     2/28/19 @ 1526     ED: Date/Time/Mode of Arrival:   ED Arrival Information     Expected Arrival Acuity Means of Arrival Escorted By Service Admission Type    2/28/2019 2/28/2019 11:19 Emergent Ambulance [address] [facility]  ([facility]) General Medicine Emergency    Arrival Complaint    SOB        Chief Complaint:   Chief Complaint   Patient presents with    Shortness of Breath     Pt from [address], was hypoxic and altered this morning      History of Illness:     51-year-old female from [facility] who was brought to the emergency room for evaluation after noted "not herself this morning"   (altered) and also hypoxic 80% on room air, previously no o2 requirements    Supplemental oxygen was placed on the patient with improvement of her saturations to the low 90s  Dry oral mucosa ; Absent breath sounds on the right;  Scattered rhonchi   Patient is able to follow commands  ; She is nonverbal;  Strength is equal and symmetric in bilateral upper and lower extremities  Cap refill >3 sec    02/28/19 1245    98  19  125/61  91 %  Nasal cannula  Sitting   02/28/19 1230    99  19  117/66  90 %  Nasal cannula  Sitting                    02/28/19 1128  98 2 °F (36 8 °C)  124Abnormal   20  118/66  93 %  Nasal cannula  Lying   49 4 kg (109 lb)    Supplemental oxygen  @  4L NC     Pertinent Labs/Diagnostic Test Results:   Na  152   155 ; WBC  11 87   9 54   ;   Lactic acid 2 7   1 9  ;  procalcitonin  0 20   0 18  Bun  78   76  ;  Crt  2 22   1 83  ;   GFR  19   24    Trop  0 14   0 11  ;    bnp  3224      cxr -  1  Large right pleural effusion with associated high-grade multilobar/segmental collapse in the right lung    2   New nodular opacities scattered throughout the left lung fields  This could represent infectious process or metastatic disease    Recommend further evaluation with chest CT     CTAP -  1   Left breast mass/masses suggested suspicious for the primary malignancy   This is better seen on prior dedicated breast imaging of 1/31/2019  2   Innumerable bilateral pulmonary nodules compatible with metastasis     3   Large right pleural effusion, malignant pleural effusion not excluded  4  Mediastinal lymphadenopathy suspicious for metastasis  5  Nodularity of the left adrenal gland, metastasis is not excluded     6   Multiple hypodense lesions in the liver suspicious for metastasis  7   Sclerotic lesion at the L2 vertebral body suspicious for metastasis  SPOKE with the patient's son  and reviewed the patient's findings on CT scan along with blood work  The patient son stated that they were made aware of breast lumps 2-3 weeks ago  At which time the patient did not wish to pursue these  Given what appears to be metastatic disease on CT scan I discussed the possibility of hospice/comfort care measures with the patient's son  He will discuss with his brother and believes they will pursue comfort care measures  I reviewed the patient's advance directive and she specifically does not want resuscitation or antibiotics      ED Treatment:   Medication Administration from 02/28/2019 1106 to 02/28/2019 1619       Date/Time Order Dose Route Action Action by Comments                02/28/2019 1432 sodium chloride 0 9 % infusion 125 mL/hr Intravenous New Bag Rylee Alcide Ogle, RN         Past Medical/Surgical History:    Active Ambulatory Problems     Diagnosis Date Noted    Hypertension 03/11/2016    Diabetes mellitus ([facility] [address] ) 03/11/2016    History of stroke 03/11/2016    Depression 03/11/2016    GERD (gastroesophageal reflux disease) 03/11/2016    Fracture of neck of right femur ([facility] [address] ) 03/11/2016    Urinary tract infection 03/11/2016    Seizure disorder ([facility] [address] ) 03/11/2016    Hypokalemia 03/11/2016    Right hip hemiarthroplasty 03/14/2016     Resolved Ambulatory Problems     Diagnosis Date Noted    No Resolved Ambulatory Problems Past Medical History:   Diagnosis Date    Depression     Diabetes mellitus (HCC)     GERD (gastroesophageal reflux disease)     Hyperlipidemia     Hypertension     Psychiatric disorder     Seizures (HCC)     Stroke ([facility] [address] )     UTI (urinary tract infection)      Admitting Diagnosis: Shortness of breath [R06 02]  Hypernatremia [E87 0]  Altered mental status [R41 82]  Renal failure [N19]  Hypoxia [R09 02]  Metastatic disease (HCC) [C79 9]  Pleural effusion on right [J90]  Dyspnea, unspecified type [R06 00]      Assessment/Plan:   Metabolic encephalopathy  Assessment & Plan  Unclear if this is due to CVA vs   Hypernatremia vs  Underlying malignancy   She has no focal neurologic symptoms however Will obtain neuro checks q 4 hours  Her son also stated that she sometimes does not talk   See Treatment Plan for Hypernatremia      Pleural effusion  Assessment & Plan  Likely malignant effusion  Will consider IR thoracentesis if family is agreeable     Acute respiratory failure with hypoxia ([facility] [address] )  Assessment & Plan  Patient is requiring 3 L nasal cannula to maintain oxygen saturation in the low 90s  She does not wear any oxygen at home  This is likely secondary to large right pleural effusion     Breast mass  Assessment & Plan  Likely stage IV breast cancer with mets noted diffusely on CT scan  Patient's son states there were informed of breast lumps over the past 2 weeks  Patient's son also states the patient declined any intervention     Acute kidney injury [facility])  Assessment & Plan  Not clear what patient's renal baseline is  Check urine sodium and urine creatinine  Volume expansion in the form of normal saline at 125cc/hr  No sign of obstructive uropathy on CT scan     Hypernatremia  Assessment & Plan  Likely hypovolemic hypernatremia  Patient looks very dehydrated  Normal saline at 125cc / hour  Monitor I&Os       H/o sz disorder  - cont keppra, prn ativan  H/o stroke -  Cont aggrenox, neurocks q 4, A1C, lipid panel  DM -  Hold oral antihyperglycemics,  Accucks qid w/sliding scale insulin      2/28     Admission Orders:  Admit telemetry  Oxygen prn   Sequential compression device to b/l LE ;  Up w/assist;  Diet as tolerated  IR consult re if  thoracentesis beneficial  Neuro cks q 4 hrs;  I/o q shift  accucks qid w/sliding scale insulin   IVF  @  150  Case management/ hospice consults       03/01/19 1405  98 5 °F (36 9 °C)  87    130/60  90 %  On 5L NC       03/01/19 1037          90 %   Nasal cannula     SpO2: reading on toes at 03/01/19 1037   03/01/19 0712  98 5 °F (36 9 °C)  87  20  130/60  87 %Abnormal   Nasal cannula  Lying     3/1/2019  -  Supplemental oxygen increased to 5L NC ;  Continue IVF @  150 cc/hr   Hospice to consult today   (Pending IR note re thoracentesis at time of review)      3/1  INTERNAL MEDICINE:  1/2 of Patient's blood culture is positive for gram positive cocci in clusters  2nd set still pending at this time  Discussed potential for antibiotics with patient's son, however, as patient is declining with multiple co-morbidities the plan is still hospice, which will likely be inpatient status at this point         Scheduled Meds:   Current Facility-Administered Medications:  aspirin-dipyridamole 1 capsule Oral BID Lazara Roque MD    atorvastatin 40 mg Oral QPM Lazara Roque MD    dextrose 5 % and sodium chloride 0 2 % 150 mL/hr Intravenous Continuous Lee Vera PA-C Last Rate: Stopped (03/01/19 1200)   heparin (porcine) 5,000 Units Subcutaneous Q8H Radha Hinson MD    insulin lispro 1-5 Units Subcutaneous TID AC Lazara Roque MD    levETIRAcetam 250 mg Oral BID Lazara Roque MD    verapamil 180 mg Oral Daily Lazara Roque MD      Continuous Infusions:   dextrose 5 % and sodium chloride 0 2 % 150 mL/hr Last Rate: Stopped (03/01/19 1200)

## 2019-03-01 NOTE — PHYSICAL THERAPY NOTE
PT NOTE:              Order received  Spoke with Dr Giovanni Sanchez who states no need for PT evaluation due to pt transitioning to hospice care  Will d/c PT order

## 2019-03-01 NOTE — PROGRESS NOTES
Progress Note - Yaima Raymond 4/22/1927, 80 y o  female MRN: [id number]    Unit/Bed#: 401-01 Encounter: [id number]    Primary Care Provider: Chandler Dao DO   Date and time admitted to hospital: 2/28/2019 11:19 AM        Metabolic encephalopathy  Assessment & Plan  Unclear if this is due to CVA vs   Hypernatremia vs  Underlying malignancy   She has no focal neurologic symptoms however Will obtain neuro checks q 4 hours  Her son also stated that she sometimes does not talk, stares  She is likely close to her baseline at this time  See Treatment Plan for Hypernatremia   Overall, patient's family is agreeable to hospice care, will consult hospice but in the meantime continue supportive care and treatment efforts, just nothing invasive  Pleural effusion  Assessment & Plan  Likely malignant effusion  Plan is for hospice care, will confirm and would not pursue IR consultation for thoracocentesis at this time  Acute respiratory failure with hypoxia [facility])  Assessment & Plan  Patient is requiring 3 L nasal cannula to maintain oxygen saturation in the low 90s  She does not wear any oxygen at home  This is likely secondary to large right pleural effusion    Acute kidney injury [facility])  Assessment & Plan  Not clear what patient's renal baseline is  Creatinine is improving today at 1 83  Check urine sodium and urine creatinine  Volume expansion from IVF  No sign of obstructive uropathy on CT scan    * Hypernatremia  Assessment & Plan  Likely hypovolemic hypernatremia  Patient looked very dehydrated on admission, appears to be improving, but sodium still elevated and urine very concentrated  Will continue 1/4 NS with D5 for now at 150mL/hr    Monitor I&Os      Breast mass  Assessment & Plan  Likely stage IV breast cancer with mets noted diffusely on CT scan  Patient's son states they were informed of breast lumps over the past 2 weeks  Patient's son also states the patient declined any intervention, seeking hospice care at this time  Seizure disorder [facility])  Assessment & Plan  Continue Keppra  P r n  Ativan for seizures    History of stroke  Assessment & Plan  Continue Aggrenox  Neurochecks Q 4  Fasting lipid panel is unremarkable with an LDL of 34  A1c - 7 4 which for her age is fairly well controlled  Patient appears to be close to her baseline from a mental status standpoint  Diabetes mellitus ([address] )  Assessment & Plan  Lab Results   Component Value Date    HGBA1C 6 6 (H) 2016       No results for input(s): POCGLU in the last 72 hours  Blood Sugar Average: Last 72 hrs:   hold oral antihyperglycemics  Accu-Cheks AC and HS with algorithm 1 sliding scale coverage        VTE Pharmacologic Prophylaxis:   Pharmacologic: Heparin  Mechanical VTE Prophylaxis in Place: Yes        Time Spent for Care: 30 minutes  More than 50% of total time spent on counseling and coordination of care as described above  Current Length of Stay: 1 day(s)    Current Patient Status: Inpatient   Certification Statement: The patient will continue to require additional inpatient hospital stay due to patient is in need of IVF hydration, likely inpatient hospice appropriate at this time pending formal evaluation  Discharge Plan / Estimated Discharge Date: TBD      Code Status: Level 3 - DNAR and DNI      Subjective:   Patient is nonverbal and history limited  Objective:   Vitals:   Temp (24hrs), Av 2 °F (36 8 °C), Min:98 °F (36 7 °C), Max:98 5 °F (36 9 °C)    Temp:  [98 °F (36 7 °C)-98 5 °F (36 9 °C)] 98 5 °F (36 9 °C)  HR:  [] 87  Resp:  [16-20] 20  BP: (117-130)/(56-66) 130/60  SpO2:  [87 %-93 %] 87 %  Body mass index is 21 36 kg/m²  Input and Output Summary (last 24 hours): Intake/Output Summary (Last 24 hours) at 3/1/2019 0950  Last data filed at 3/1/2019 0554  Gross per 24 hour   Intake 0 ml   Output 130 ml   Net -130 ml         Physical Exam:   Physical Exam   Constitutional: She appears well-developed  HENT:   Head: Normocephalic  Mouth/Throat: Oropharynx is clear and moist    Cardiovascular: Normal rate and regular rhythm  Murmur (systolic murmur ) heard  Pulmonary/Chest: Effort normal and breath sounds normal    Abdominal: Soft  Bowel sounds are normal    Genitourinary:   Genitourinary Comments: External urine collection system is draining very concentrated urine  Musculoskeletal: She exhibits no edema  Neurological: She is alert  Nonverbal at this time but does shake head yes and no to some questioning  Unsure of level of understanding, however  Skin: Skin is warm and dry  Nursing note and vitals reviewed  Additional Data:   Labs:    Results from last 7 days   Lab Units 03/01/19  0554   WBC Thousand/uL 9 54   HEMOGLOBIN g/dL 12 8   HEMATOCRIT % 41 7   PLATELETS Thousands/uL 267   NEUTROS PCT % 74   LYMPHS PCT % 18   MONOS PCT % 8   EOS PCT % 0     Results from last 7 days   Lab Units 03/01/19  0554   POTASSIUM mmol/L 3 7   CHLORIDE mmol/L 119*   CO2 mmol/L 24   BUN mg/dL 76*   CREATININE mg/dL 1 83*   CALCIUM mg/dL 8 6   ALK PHOS U/L 94   ALT U/L 19   AST U/L 28     Results from last 7 days   Lab Units 02/28/19  1137   INR  1 21*         * I Have Reviewed All Lab Data Listed Above  * Additional Pertinent Lab Tests Reviewed: All Labs Within Last 24 Hours Reviewed    Imaging:  Imaging Reports Reviewed Today Include: no new imaging to review          Recent Cultures (last 7 days):     Results from last 7 days   Lab Units 02/28/19  1137   INFLUENZA B PCR  Not Detected   RSV PCR  Not Detected         Last 24 Hours Medication List:     Current Facility-Administered Medications:  aspirin-dipyridamole 1 capsule Oral BID Lazara Roque MD   atorvastatin 40 mg Oral QPM Lazara Roque MD   dextrose 5 % and sodium chloride 0 33 % 150 mL/hr Intravenous Continuous Lee Vera PA-C   heparin (porcine) 5,000 Units Subcutaneous Q8H Tiesha Mendez MD   insulin lispro 1-5 Units Subcutaneous TID JAN Haile Sandra Roque MD   levETIRAcetam 250 mg Oral BID Yeyo Mchugh MD   verapamil 180 mg Oral Daily Yeyo Mchugh MD        Today, Patient Was Seen By: Carlos Enrique Burgess PA-C    ** Please Note: Dragon 360 Dictation voice to text software may have been used in the creation of this document   **

## 2019-03-01 NOTE — PROGRESS NOTES
1/2 of Patient's blood culture is positive for gram positive cocci in clusters  2nd set still pending at this time  Discussed potential for antibiotics with patient's son, however, as patient is declining with multiple co-morbidities the plan is still hospice, which will likely be inpatient status at this point

## 2019-03-01 NOTE — SOCIAL WORK
Chart reviewed by case management, pt has been assessed and has been [facility] with [facility]

## 2019-03-01 NOTE — OCCUPATIONAL THERAPY NOTE
Occupational Therapy         Patient Name: Patria GATICA Date: 3/1/2019    Order received and chart review performed; per MD, pt is transitioning to hospice care; will complete OT orders at this time

## 2019-03-01 NOTE — PLAN OF CARE
Problem: Potential for Falls  Goal: Patient will remain free of falls  Description  INTERVENTIONS:  - Assess patient frequently for physical needs  -  Identify cognitive and physical deficits and behaviors that affect risk of falls  -  [place] fall precautions as indicated by assessment  High fall risk    - Educate patient/family on patient safety including physical limitations  - Instruct patient to call for assistance with activity based on assessment  - Modify environment to reduce risk of injury  - Consider OT/PT consult to assist with strengthening/mobility   Outcome: Progressing     Problem: Prexisting or High Potential for Compromised Skin Integrity  Goal: Skin integrity is maintained or improved  Description  INTERVENTIONS:  - Identify patients at risk for skin breakdown  - Assess and monitor skin integrity  - Assess and monitor nutrition and hydration status  - Monitor labs (i e  albumin)  - Assess for incontinence   - Turn and reposition patient  - Assist with mobility/ambulation  - Relieve pressure over bony prominences  - Avoid friction and shearing  - Provide appropriate hygiene as needed including keeping skin clean and dry  - Evaluate need for skin moisturizer/barrier cream  - Collaborate with interdisciplinary team (i e  Nutrition, Rehabilitation, etc )   - Patient/family teaching  Outcome: Progressing     Problem: Nutrition/Hydration-ADULT  Goal: Nutrient/Hydration intake appropriate for improving, restoring or maintaining nutritional needs  Description  Monitor and assess patient's nutrition/hydration status for malnutrition (ex- brittle hair, bruises, dry skin, pale skin and conjunctiva, muscle wasting, smooth red tongue, and disorientation)  Collaborate with interdisciplinary team and initiate plan and interventions as ordered  Monitor patient's weight and dietary intake as ordered or per policy  Utilize nutrition screening tool and intervene per policy   Determine patient's food preferences and provide high-protein, high-caloric foods as appropriate       INTERVENTIONS:  - Monitor oral intake, urinary output, labs, and treatment plans  - Assess nutrition and hydration status and recommend course of action  - Evaluate amount of meals eaten  - Assist patient with eating if necessary   - Allow adequate time for meals  - Recommend/ encourage appropriate diets, oral nutritional supplements, and vitamin/mineral supplements  - Order, calculate, and assess calorie counts as needed  - Recommend, monitor, and adjust tube feedings and TPN/PPN based on assessed needs  - Assess need for intravenous fluids  - Provide specific nutrition/hydration education as appropriate  - Include patient/family/caregiver in decisions related to nutrition  Outcome: Progressing     Problem: PAIN - ADULT  Goal: Verbalizes/displays adequate comfort level or baseline comfort level  Description  Interventions:  - Encourage patient to monitor pain and request assistance  - Assess pain using appropriate pain scale  - Administer analgesics based on type and severity of pain and evaluate response  - Implement non-pharmacological measures as appropriate and evaluate response  - Consider cultural and social influences on pain and pain management  - Notify physician/advanced practitioner if interventions unsuccessful or patient reports new pain  Outcome: Progressing     Problem: INFECTION - ADULT  Goal: Absence or prevention of progression during hospitalization  Description  INTERVENTIONS:  - Assess and monitor for signs and symptoms of infection  - Monitor lab/diagnostic results  - Monitor all insertion sites, i e  indwelling lines, tubes, and drains  - Administer medications as ordered  - Instruct and encourage patient and family to use good hand hygiene technique  - Identify and instruct in appropriate isolation precautions for identified infection/condition   Outcome: Progressing     Problem: SAFETY ADULT  Goal: Patient will remain free of falls  Description  INTERVENTIONS:  - Assess patient frequently for physical needs  -  Identify cognitive and physical deficits and behaviors that affect risk of falls  -  [place] fall precautions as indicated by assessment  High fall risk    - Educate patient/family on patient safety including physical limitations  - Instruct patient to call for assistance with activity based on assessment  - Modify environment to reduce risk of injury  - Consider OT/PT consult to assist with strengthening/mobility   Outcome: Progressing  Goal: Maintain or return to baseline ADL function  Description  INTERVENTIONS:  - Assess patient frequently for physical needs  -  Identify cognitive and physical deficits and behaviors that affect risk of falls  -  [place] fall precautions as indicated by assessment   High fall risk    - Educate patient/family on patient safety including physical limitations  - Instruct patient to call for assistance with activity based on assessment  - Modify environment to reduce risk of injury  - Consider OT/PT consult to assist with strengthening/mobility   Outcome: Progressing  Goal: Maintain or return mobility status to optimal level  Description  INTERVENTIONS:  -  Assess patient's ability to carry out ADLs; assess patient's baseline for ADL function and identify physical deficits which impact ability to perform ADLs (bathing, care of mouth/teeth, toileting, grooming, dressing, etc )  - Assess/evaluate cause of self-care deficits   - Assess range of motion  - Assess patient's mobility; develop plan if impaired  - Assess patient's need for assistive devices and provide as appropriate  - Encourage maximum independence but intervene and supervise when necessary  ¯ Involve family in performance of ADLs  ¯ Assess for home care needs following discharge   ¯ Request OT consult to assist with ADL evaluation and planning for discharge  ¯ Provide patient education as appropriate  Outcome: Progressing Problem: DISCHARGE PLANNING  Goal: Discharge to home or other facility with appropriate resources  Description  INTERVENTIONS:  - Identify barriers to discharge w/patient and caregiver  - Arrange for needed discharge resources and transportation as appropriate  - Identify discharge learning needs (meds, wound care, etc )  - Arrange for interpretive services to assist at discharge as needed  - Refer to Case Management Department for coordinating discharge planning if the patient needs post-hospital services based on physician/advanced practitioner order or complex needs related to functional status, cognitive ability, or social support system  Outcome: Progressing     Problem: Knowledge Deficit  Goal: Patient/family/caregiver demonstrates understanding of disease process, treatment plan, medications, and discharge instructions  Description  Complete learning assessment and assess knowledge base    Interventions:  - Provide teaching at level of understanding  - Provide teaching via preferred learning methods  Outcome: Progressing     Problem: METABOLIC, FLUID AND ELECTROLYTES - ADULT  Goal: Electrolytes maintained within normal limits  Description  INTERVENTIONS:  - Monitor labs and assess patient for signs and symptoms of electrolyte imbalances  - Administer electrolyte replacement as ordered  - Monitor response to electrolyte replacements, including repeat lab results as appropriate  - Instruct patient on fluid and nutrition as appropriate  Outcome: Progressing  Goal: Glucose maintained within target range  Description  INTERVENTIONS:  - Monitor Blood Glucose as ordered  - Assess for signs and symptoms of hyperglycemia and hypoglycemia  - Administer ordered medications to maintain glucose within target range  - Assess nutritional intake and initiate nutrition service referral as needed  Outcome: Progressing     Problem: SKIN/TISSUE INTEGRITY - ADULT  Goal: Skin integrity remains intact  Description  INTERVENTIONS  - Identify patients at risk for skin breakdown  - Assess and monitor skin integrity  - Assess and monitor nutrition and hydration status  - Monitor labs (i e  albumin)  - Assess for incontinence   - Turn and reposition patient  - Assist with mobility/ambulation  - Relieve pressure over bony prominences  - Avoid friction and shearing  - Provide appropriate hygiene as needed including keeping skin clean and dry  - Evaluate need for skin moisturizer/barrier cream  - Collaborate with interdisciplinary team (i e  Nutrition, Rehabilitation, etc )   - Patient/family teaching  Outcome: Progressing     Problem: MUSCULOSKELETAL - ADULT  Goal: Maintain or return mobility to safest level of function  Description  INTERVENTIONS:  - Assess patient's ability to carry out ADLs; assess patient's baseline for ADL function and identify physical deficits which impact ability to perform ADLs (bathing, care of mouth/teeth, toileting, grooming, dressing, etc )  - Assess/evaluate cause of self-care deficits   - Assess range of motion  - Assess patient's mobility; develop plan if impaired  - Assess patient's need for assistive devices and provide as appropriate  - Encourage maximum independence but intervene and supervise when necessary  - Involve family in performance of ADLs  - Assess for home care needs following discharge   - Request OT consult to assist with ADL evaluation and planning for discharge  - Provide patient education as appropriate  Outcome: Progressing     Problem: DISCHARGE PLANNING - CARE MANAGEMENT  Goal: Discharge to post-acute care or home with appropriate resources  Description  INTERVENTIONS:  - Conduct assessment to determine patient/family and health care team treatment goals, and need for post-acute services based on payer coverage, community resources, and patient preferences, and barriers to discharge  - Address psychosocial, clinical, and financial barriers to discharge as identified in assessment in conjunction with the patient/family and health care team  - Arrange appropriate level of post-acute services according to patient's   needs and preference and payer coverage in collaboration with the physician and health care team  - Communicate with and update the patient/family, physician, and health care team regarding progress on the discharge plan  - Arrange appropriate transportation to post-acute venues  -15 day bed[facility]  -inpatient hospice referral   Outcome: Progressing

## 2019-03-01 NOTE — HOSPICE NOTE
Pt accepted to inpatient hospice by Dr Eugene Rebolledo with metastatic Breast Cancer  Family in agreement with hospice care  Pt crying to touch and having respiratory distress and increased secretions  Update To [facility] and to Nurse Kody Hough    PACS notiied to transfer patient

## 2019-03-01 NOTE — PROGRESS NOTES
Asked pt if she was hungry and pt shook her head no  I also asked pt if she could try to take her meds in applesauce and she shook her head no

## 2019-03-02 NOTE — HOSPICE NOTE
[address] [place] 80 y o  female MRN: [id number]    Unit/Bed#: 401-01 Encounter: [id number]      Patient has one or more of, but not limited to the following:  Frequent evaluation by the physician and or nurse PATIENT NEEDS FREQUENT ASSESSMENT FOR AS NEEDED MEDICATION DUE TO SIG DECLINE       Criteria for transfer from [facility] level of care:  Re-established family and or caregiver support system  Family cannot provide skilled care in the home  When GIP is NOT appropriate: It is not intended to provide relief for a caregiver  If a caregiver is not in the home or unable to help the patient adequately, other arrangements can or should be made  PATIENT IS BED BOUND POSITIONED WITH USE OF PILLOWS  HAS DECREASED LUNG SOUNDS  oxygen ON VIA NASAL CANNULA   COMFORT MEASURES PT IS NOT RESPONSIVE  Mckayla Blend PAIN AD 0 WITH USE OF IV MORPHINE  Mckayla Blend PHONED PATIENTS FAMILY LEFT MESSAGE FOR FAMILY TO RETURN CALL TO REVIEW CARE  Mckayla Blend This daily assessment is discussed and approved by the physician, RN case manager, and/or charge nurse, and , and  and determines the continued appropriateness of the general inpatient level of care

## 2019-03-02 NOTE — DISCHARGE SUMMARY
Discharge Summary - Earle Travis 80 y o  female MRN: [id number]    Unit/Bed#: 401-01 Encounter: [id number]    Admission Date:     Admitting Diagnosis: Breast cancer ([facility] [address] ) [C50 959]    HPI: Patient is a 59-year-old female from [facility] who was brought to the emergency room for evaluation after noted to be hypoxic  Patient is currently nonverbal and history is obtained via a review of medical records and speaking with the patient's son      The patient was noted to be hypoxic and short of breath earlier today at [facility]  Supplemental oxygen was placed on the patient with improvement of her saturations to the low 90s  She was then transported to [facility] were imaging studies are discussed below per     I spoke with the patient's son Letta Moritz at approximately [phone] today and reviewed the patient's findings on CT scan along with blood work  The patient son stated that they were made aware of breast lumps 2-3 weeks ago  At which time the patient did not wish to pursue these  Given what appears to be metastatic disease on CT scan I discussed the possibility of hospice/comfort care measures with the patient's son  He will discuss with his brother and believes they will pursue comfort care measures  I reviewed the patient's advance directive and she specifically does not want resuscitation or antibiotics      I also discussed with the son if she expressed any wishes regarding her goals of care, he said she did not  I asked him if it would be okay to administer intravenous fluids and he was agreeable to that  Procedures Performed: No orders of the defined types were placed in this encounter  Summary of Hospital Course:     Breast mass  Pleural effusion  Metabolic encephalopathy     Patient admitted to medical floor  Review of her advance directive revealed a DNR status and no antibiotics    I spoke with the patient's son who stated he would be arriving to the hospital shortly  When he arrived at approximately 6:30 p m  I had a family meeting with him and his wife regarding the goals of care for his mother  The had previous experience with hospice,  And wanted to proceed with hospice  consultation with case management was obtained  Overnight the patient was maintained on IV fluids and supportive care in the form of supplemental oxygen and angiolytics  Hospice nurse evaluated the patient on 03/01/2019, patient was transitioned to inpatient hospice  Patient also had 1 of 2 blood cultures growing Gram-positive organism  Again this was discussed with the family and they elected to continue with hospice  Significant Findings, Care, Treatment and Services Provided:     Complications:     Discharge Diagnosis:     Resolved Problems  Date Reviewed: 3/1/2019    None          Condition at Discharge:      Comfortable     Discharge instructions/Information to patient and family:   See after visit summary for information provided to patient and family  Provisions for Follow-Up Care:  See after visit summary for information related to follow-up care and any pertinent home health orders  PCP: Natalya Aleman DO    Disposition: DC to hospice    Planned Readmission: No    Discharge Statement   I spent 25 minutes discharging the patient  This time was spent on the day of discharge  I had direct contact with the patient on the day of discharge  Additional documentation is required if more than 30 minutes were spent on discharge  Discharge Medications:  See after visit summary for reconciled discharge medications provided to patient and family

## 2019-03-03 NOTE — DEATH NOTE
INPATIENT DEATH NOTE  Irma Delaney 80 y o  female MRN: [id number]  Unit/Bed#: 401-01 Encounter: [id number]    Date, Time and Cause of Death    Date of Death:  3/3/19  Time of Death:   4:45 AM  Preliminary Cause of Death:  Breast CA ([facility] [address] )  Entered by:  Amy Simon PA-C[[id number]]     Attribution     KB1 1 Amy Simon PA-C 19 05:49           Patient's Information  Pronounced by: Amy Simon PA-C  Did the patient's death occur in the ED?: No  Did the patient's death occur in the OR?: No  Did the patient's death occur less than 10 days post-op?: No  Did the patient's death occur within 24 hours of admission?: Yes(Hospice )  Was code status DNR at the time of death?: Yes    PHYSICAL EXAM:  Unresponsive to noxious stimuli, Spontaneous respirations absent, Breath sounds absent, Carotid pulse absent, Heart sounds absent, Pupillary light reflex absent and Corneal blink reflex absent    Medical Examiner notification criteria:  NONE APPLICABLE   Medical Examiner's office notified?:  No, does not meet ME notification criteria   Medical Examiner accepted case?:  No  Name of Medical Examiner: NA    Family Notification  Was the family notified?: Yes  Date Notified: 19  Time Notified: 0500  Notified by: Houston Ornelas  Name of Family Notified of Death: Fiordaliza [place]   Relationship to Patient: Son  Family Notification Route: Telephone  Was the family told to contact a  home?: Yes  Name of  Home[de-identified] [address]    Autopsy Options:  NA    Primary Service Attending Physician notified?:  yes - Attending:  Shin Jones MD    Physician/Resident responsible for completing Discharge Summary:  Shin Jones MD

## 2019-03-03 NOTE — PROGRESS NOTES
home was contacted [facility]) in [place]  Operating service answered,  #8 was spoken to, will have the  home contact the hospital  Patient was transported to the [facility] at [id number] with security

## 2019-03-05 LAB — BACTERIA BLD CULT: NORMAL
